# Patient Record
Sex: FEMALE | Race: WHITE
[De-identification: names, ages, dates, MRNs, and addresses within clinical notes are randomized per-mention and may not be internally consistent; named-entity substitution may affect disease eponyms.]

---

## 2018-05-31 ENCOUNTER — HOSPITAL ENCOUNTER (OUTPATIENT)
Dept: HOSPITAL 83 - ORTHO | Age: 48
Discharge: HOME | End: 2018-05-31
Attending: ORTHOPAEDIC SURGERY
Payer: COMMERCIAL

## 2018-05-31 DIAGNOSIS — M17.12: Primary | ICD-10-CM

## 2018-05-31 DIAGNOSIS — Z98.890: ICD-10-CM

## 2018-07-06 ENCOUNTER — HOSPITAL ENCOUNTER (OUTPATIENT)
Dept: HOSPITAL 83 - LAB | Age: 48
Discharge: HOME | End: 2018-07-06
Attending: ORTHOPAEDIC SURGERY
Payer: COMMERCIAL

## 2018-07-06 DIAGNOSIS — I10: ICD-10-CM

## 2018-07-06 DIAGNOSIS — Z48.89: Primary | ICD-10-CM

## 2018-07-06 LAB
ALBUMIN SERPL-MCNC: 3.6 GM/DL (ref 3.1–4.5)
ALP SERPL-CCNC: 92 U/L (ref 45–117)
ALT SERPL W P-5'-P-CCNC: 14 U/L (ref 12–78)
APPEARANCE UR: CLEAR
AST SERPL-CCNC: 11 IU/L (ref 3–35)
BACTERIA #/AREA URNS HPF: (no result) /[HPF]
BASOPHILS # BLD AUTO: 0.1 10*3/UL (ref 0–0.1)
BASOPHILS NFR BLD AUTO: 0.5 % (ref 0–1)
BILIRUB UR QL STRIP: NEGATIVE
BUN SERPL-MCNC: 14 MG/DL (ref 7–24)
CHLORIDE SERPL-SCNC: 106 MMOL/L (ref 98–107)
COLOR UR: YELLOW
CREAT SERPL-MCNC: 0.71 MG/DL (ref 0.55–1.02)
CRYSTALS URNS MICRO: (no result)
EOSINOPHIL # BLD AUTO: 0.3 10*3/UL (ref 0–0.4)
EOSINOPHIL # BLD AUTO: 2.7 % (ref 1–4)
EPI CELLS #/AREA URNS HPF: (no result) /[HPF]
ERYTHROCYTE [DISTWIDTH] IN BLOOD BY AUTOMATED COUNT: 13.2 % (ref 0–14.5)
GLUCOSE UR QL: NEGATIVE
HCT VFR BLD AUTO: 42.9 % (ref 37–47)
HGB BLD-MCNC: 13.8 G/DL (ref 12–16)
HGB UR QL STRIP: NEGATIVE
KETONES UR QL STRIP: NEGATIVE
LEUKOCYTE ESTERASE UR QL STRIP: NEGATIVE
LYMPHOCYTES # BLD AUTO: 2.9 10*3/UL (ref 1.3–4.4)
LYMPHOCYTES NFR BLD AUTO: 26.6 % (ref 27–41)
MCH RBC QN AUTO: 29.7 PG (ref 27–31)
MCHC RBC AUTO-ENTMCNC: 32.2 G/DL (ref 33–37)
MCV RBC AUTO: 92.5 FL (ref 81–99)
MONOCYTES # BLD AUTO: 0.8 10*3/UL (ref 0.1–1)
MONOCYTES NFR BLD MANUAL: 6.8 % (ref 3–9)
NEUT #: 6.9 10*3/UL (ref 2.3–7.9)
NEUT %: 63.1 % (ref 47–73)
NITRITE UR QL STRIP: NEGATIVE
NRBC BLD QL AUTO: 0 % (ref 0–0)
PH UR STRIP: 6 [PH] (ref 5–9)
PLATELET # BLD AUTO: 300 10*3/UL (ref 130–400)
PMV BLD AUTO: 9.6 FL (ref 9.6–12.3)
POTASSIUM SERPL-SCNC: 4.2 MMOL/L (ref 3.5–5.1)
PROT SERPL-MCNC: 8.1 GM/DL (ref 6.4–8.2)
RBC # BLD AUTO: 4.64 10*6/UL (ref 4.1–5.1)
RBC #/AREA URNS HPF: (no result) RBC/HPF (ref 0–2)
SODIUM SERPL-SCNC: 143 MMOL/L (ref 136–145)
SP GR UR: 1.02 (ref 1–1.03)
UROBILINOGEN UR STRIP-MCNC: 1 E.U./DL (ref 0.2–1)
WBC #/AREA URNS HPF: (no result) WBC/HPF (ref 0–5)
WBC NRBC COR # BLD AUTO: 11 10*3/UL (ref 4.8–10.8)

## 2018-07-12 VITALS — DIASTOLIC BLOOD PRESSURE: 95 MMHG

## 2018-08-02 ENCOUNTER — HOSPITAL ENCOUNTER (OUTPATIENT)
Dept: HOSPITAL 83 - SDC | Age: 48
Discharge: HOME | End: 2018-08-02
Attending: ORTHOPAEDIC SURGERY
Payer: COMMERCIAL

## 2018-08-02 VITALS — SYSTOLIC BLOOD PRESSURE: 141 MMHG | DIASTOLIC BLOOD PRESSURE: 73 MMHG

## 2018-08-02 VITALS — DIASTOLIC BLOOD PRESSURE: 84 MMHG | SYSTOLIC BLOOD PRESSURE: 138 MMHG

## 2018-08-02 VITALS — WEIGHT: 240 LBS | BODY MASS INDEX: 39.99 KG/M2 | HEIGHT: 65 IN

## 2018-08-02 VITALS — DIASTOLIC BLOOD PRESSURE: 71 MMHG | SYSTOLIC BLOOD PRESSURE: 137 MMHG

## 2018-08-02 VITALS — SYSTOLIC BLOOD PRESSURE: 155 MMHG | DIASTOLIC BLOOD PRESSURE: 88 MMHG

## 2018-08-02 VITALS — DIASTOLIC BLOOD PRESSURE: 83 MMHG

## 2018-08-02 VITALS — DIASTOLIC BLOOD PRESSURE: 77 MMHG

## 2018-08-02 DIAGNOSIS — E03.9: ICD-10-CM

## 2018-08-02 DIAGNOSIS — T84.84XA: Primary | ICD-10-CM

## 2018-08-02 DIAGNOSIS — Z98.890: ICD-10-CM

## 2018-08-02 DIAGNOSIS — Z79.899: ICD-10-CM
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
## 2018-08-02 DIAGNOSIS — E66.09: ICD-10-CM

## 2018-08-02 DIAGNOSIS — F32.9: ICD-10-CM

## 2018-08-02 DIAGNOSIS — I10: ICD-10-CM

## 2018-08-29 ENCOUNTER — HOSPITAL ENCOUNTER (OUTPATIENT)
Dept: HOSPITAL 83 - LAB | Age: 48
Discharge: HOME | End: 2018-08-29
Attending: FAMILY MEDICINE
Payer: COMMERCIAL

## 2018-08-29 DIAGNOSIS — Z13.220: ICD-10-CM

## 2018-08-29 DIAGNOSIS — M79.89: ICD-10-CM

## 2018-08-29 DIAGNOSIS — Z13.1: ICD-10-CM

## 2018-08-29 DIAGNOSIS — E55.9: ICD-10-CM

## 2018-08-29 DIAGNOSIS — E03.9: ICD-10-CM

## 2018-08-29 DIAGNOSIS — Z12.31: Primary | ICD-10-CM

## 2018-08-29 LAB
ALBUMIN SERPL-MCNC: 3.6 GM/DL (ref 3.1–4.5)
ALP SERPL-CCNC: 102 U/L (ref 45–117)
ALT SERPL W P-5'-P-CCNC: 20 U/L (ref 12–78)
AST SERPL-CCNC: 19 IU/L (ref 3–35)
BASOPHILS # BLD AUTO: 0.1 10*3/UL (ref 0–0.1)
BASOPHILS NFR BLD AUTO: 0.6 % (ref 0–1)
BUN SERPL-MCNC: 17 MG/DL (ref 7–24)
CHLORIDE SERPL-SCNC: 107 MMOL/L (ref 98–107)
CHOLEST SERPL-MCNC: 185 MG/DL (ref ?–200)
CREAT SERPL-MCNC: 0.7 MG/DL (ref 0.55–1.02)
EOSINOPHIL # BLD AUTO: 0.3 10*3/UL (ref 0–0.4)
EOSINOPHIL # BLD AUTO: 2.5 % (ref 1–4)
ERYTHROCYTE [DISTWIDTH] IN BLOOD BY AUTOMATED COUNT: 14 % (ref 0–14.5)
HCT VFR BLD AUTO: 43.3 % (ref 37–47)
HDLC SERPL-MCNC: 45 MG/DL (ref 40–60)
HGB BLD-MCNC: 13.9 G/DL (ref 12–16)
LDLC SERPL DIRECT ASSAY-MCNC: 117 MG/DL (ref 9–159)
LYMPHOCYTES # BLD AUTO: 2.8 10*3/UL (ref 1.3–4.4)
LYMPHOCYTES NFR BLD AUTO: 27.9 % (ref 27–41)
MCH RBC QN AUTO: 29.6 PG (ref 27–31)
MCHC RBC AUTO-ENTMCNC: 32.1 G/DL (ref 33–37)
MCV RBC AUTO: 92.1 FL (ref 81–99)
MONOCYTES # BLD AUTO: 0.7 10*3/UL (ref 0.1–1)
MONOCYTES NFR BLD MANUAL: 7 % (ref 3–9)
NEUT #: 6.3 10*3/UL (ref 2.3–7.9)
NEUT %: 61.7 % (ref 47–73)
NRBC BLD QL AUTO: 0 10*3/UL (ref 0–0)
PLATELET # BLD AUTO: 254 10*3/UL (ref 130–400)
PMV BLD AUTO: 10.4 FL (ref 9.6–12.3)
POTASSIUM SERPL-SCNC: 4.1 MMOL/L (ref 3.5–5.1)
PROT SERPL-MCNC: 7.9 GM/DL (ref 6.4–8.2)
RBC # BLD AUTO: 4.7 10*6/UL (ref 4.1–5.1)
SODIUM SERPL-SCNC: 139 MMOL/L (ref 136–145)
TRIGL SERPL-MCNC: 117 MG/DL (ref ?–150)
VLDLC SERPL CALC-MCNC: 23 MG/DL (ref 6–40)
WBC NRBC COR # BLD AUTO: 10.2 10*3/UL (ref 4.8–10.8)

## 2020-02-20 ENCOUNTER — HOSPITAL ENCOUNTER (EMERGENCY)
Dept: HOSPITAL 83 - ED | Age: 50
Discharge: HOME | End: 2020-02-20
Payer: COMMERCIAL

## 2020-02-20 VITALS — HEIGHT: 65 IN | BODY MASS INDEX: 33.32 KG/M2 | WEIGHT: 200 LBS

## 2020-02-20 DIAGNOSIS — I10: ICD-10-CM

## 2020-02-20 DIAGNOSIS — E07.9: ICD-10-CM

## 2020-02-20 DIAGNOSIS — M17.11: Primary | ICD-10-CM

## 2020-02-20 DIAGNOSIS — Z79.899: ICD-10-CM

## 2020-06-01 ENCOUNTER — HOSPITAL ENCOUNTER (OUTPATIENT)
Dept: HOSPITAL 83 - LAB | Age: 50
Discharge: HOME | End: 2020-06-01
Attending: ORTHOPAEDIC SURGERY
Payer: COMMERCIAL

## 2020-06-01 DIAGNOSIS — I10: Primary | ICD-10-CM

## 2020-06-01 DIAGNOSIS — M25.50: ICD-10-CM

## 2020-06-01 LAB
BASOPHILS # BLD AUTO: 0.1 10*3/UL (ref 0–0.1)
BASOPHILS NFR BLD AUTO: 0.6 % (ref 0–1)
EOSINOPHIL # BLD AUTO: 0.4 10*3/UL (ref 0–0.4)
EOSINOPHIL # BLD AUTO: 3.5 % (ref 1–4)
ERYTHROCYTE [DISTWIDTH] IN BLOOD BY AUTOMATED COUNT: 14.3 % (ref 0–14.5)
HCT VFR BLD AUTO: 45.5 % (ref 37–47)
LYMPHOCYTES # BLD AUTO: 2.6 10*3/UL (ref 1.3–4.4)
LYMPHOCYTES NFR BLD AUTO: 24.8 % (ref 27–41)
MCH RBC QN AUTO: 30.5 PG (ref 27–31)
MCHC RBC AUTO-ENTMCNC: 33 G/DL (ref 33–37)
MCV RBC AUTO: 92.7 FL (ref 81–99)
MONOCYTES # BLD AUTO: 0.7 10*3/UL (ref 0.1–1)
MONOCYTES NFR BLD MANUAL: 6.4 % (ref 3–9)
NEUT #: 6.7 10*3/UL (ref 2.3–7.9)
NEUT %: 64.3 % (ref 47–73)
NRBC BLD QL AUTO: 0 % (ref 0–0)
PLATELET # BLD AUTO: 279 10*3/UL (ref 130–400)
PMV BLD AUTO: 10 FL (ref 9.6–12.3)
RBC # BLD AUTO: 4.91 10*6/UL (ref 4.1–5.1)
WBC NRBC COR # BLD AUTO: 10.3 10*3/UL (ref 4.8–10.8)

## 2020-06-02 LAB
ENA RNP AB SER-ACNC: <0.2 AI (ref 0–0.9)
HEPATITIS C VIRUS ANTIBODY: <0.1 S/CO (ref 0–0.9)
RHEUMATOID FACT SERPL-ACNC: 63.1 IU/ML (ref 0–13.9)

## 2020-06-03 LAB — CCP IGA+IGG SERPL IA-ACNC: >250 UNITS (ref 0–19)

## 2020-06-04 LAB
DVVTMIXRFX: (no result)
LA NT PLATELET PPP: 39.8 SEC (ref 0–51.9)
SCREEN DRVVT: 62 SEC (ref 0–47)

## 2020-09-30 ENCOUNTER — HOSPITAL ENCOUNTER (OUTPATIENT)
Dept: CT IMAGING | Age: 50
Discharge: HOME | End: 2020-10-02
Payer: COMMERCIAL

## 2020-09-30 PROCEDURE — 73700 CT LOWER EXTREMITY W/O DYE: CPT

## 2020-10-06 ENCOUNTER — HOSPITAL ENCOUNTER (OUTPATIENT)
Dept: PREADMISSION TESTING | Age: 50
Discharge: HOME | End: 2020-10-06
Payer: COMMERCIAL

## 2020-10-06 ENCOUNTER — ANESTHESIA EVENT (OUTPATIENT)
Dept: OPERATING ROOM | Age: 50
End: 2020-10-06
Payer: COMMERCIAL

## 2020-10-06 VITALS
DIASTOLIC BLOOD PRESSURE: 81 MMHG | RESPIRATION RATE: 16 BRPM | SYSTOLIC BLOOD PRESSURE: 141 MMHG | TEMPERATURE: 97.9 F | WEIGHT: 266 LBS | HEART RATE: 84 BPM | BODY MASS INDEX: 44.32 KG/M2 | OXYGEN SATURATION: 96 % | HEIGHT: 65 IN

## 2020-10-06 LAB — POTASSIUM SERPL-SCNC: 4.2 MMOL/L (ref 3.5–5)

## 2020-10-06 PROCEDURE — 84132 ASSAY OF SERUM POTASSIUM: CPT

## 2020-10-06 PROCEDURE — 36415 COLL VENOUS BLD VENIPUNCTURE: CPT

## 2020-10-06 PROCEDURE — 87081 CULTURE SCREEN ONLY: CPT

## 2020-10-06 RX ORDER — OXYBUTYNIN CHLORIDE 5 MG/1
5 TABLET, EXTENDED RELEASE ORAL 2 TIMES DAILY
COMMUNITY

## 2020-10-06 RX ORDER — MELOXICAM 15 MG/1
15 TABLET ORAL DAILY
COMMUNITY

## 2020-10-06 RX ORDER — HYDROCHLOROTHIAZIDE 25 MG/1
25 TABLET ORAL DAILY
COMMUNITY

## 2020-10-06 RX ORDER — MIDAZOLAM HYDROCHLORIDE 1 MG/ML
1 INJECTION INTRAMUSCULAR; INTRAVENOUS PRN
Status: CANCELLED | OUTPATIENT
Start: 2020-10-14

## 2020-10-06 RX ORDER — ROPIVACAINE HYDROCHLORIDE 5 MG/ML
30 INJECTION, SOLUTION EPIDURAL; INFILTRATION; PERINEURAL ONCE
Status: CANCELLED | OUTPATIENT
Start: 2020-10-14

## 2020-10-06 RX ORDER — HYDROXYCHLOROQUINE SULFATE 200 MG/1
TABLET, FILM COATED ORAL 2 TIMES DAILY
COMMUNITY

## 2020-10-06 RX ORDER — LEVOTHYROXINE SODIUM 0.03 MG/1
25 TABLET ORAL DAILY
COMMUNITY

## 2020-10-06 SDOH — HEALTH STABILITY: MENTAL HEALTH: HOW OFTEN DO YOU HAVE A DRINK CONTAINING ALCOHOL?: NEVER

## 2020-10-06 ASSESSMENT — KOOS JR
HOW SEVERE IS YOUR KNEE STIFFNESS AFTER FIRST WAKING IN MORNING: 3
RISING FROM SITTING: 3
STRAIGHTENING KNEE FULLY: 1
BENDING TO THE FLOOR TO PICK UP OBJECT: 3
TWISING OR PIVOTING ON KNEE: 3
STANDING UPRIGHT: 3
GOING UP OR DOWN STAIRS: 3

## 2020-10-06 ASSESSMENT — LIFESTYLE VARIABLES: SMOKING_STATUS: 0

## 2020-10-06 NOTE — PROGRESS NOTES
[facility] PRE-ADMISSION TESTING INSTRUCTIONS    The Preadmission Testing patient is instructed accordingly using the following criteria (check applicable):    ARRIVAL INSTRUCTIONS:  [x] Parking the day of Surgery is located in the Main Entrance lot. Upon entering the door, make an immediate right to the surgery reception desk    [] [phone] is available Monday through Friday 6 am to 6 pm    [x] Bring photo ID and insurance card    [] Bring in a copy of Living will or Durable Power of  papers. [] Please be sure to arrange for responsible adult to provide transportation to and from the hospital    [] Please arrange for responsible adult to be with you for the 24 hour period post procedure due to having anesthesia      GENERAL INSTRUCTIONS:    [x] Nothing by mouth after midnight, including gum, candy, mints or water    [x] You may brush your teeth, but do not swallow any water    [] Take medications as instructed with 1-2 oz of water    [] Stop herbal supplements and vitamins 5 days prior to procedure    [] Follow preop dosing of blood thinners per physician instructions    [] Take 1/2 dose of evening insulin, but no insulin after midnight    [] No oral diabetic medications after midnight    [] If diabetic and have low blood sugar or feel symptomatic, take 1-2oz apple juice only    [] Bring inhalers day of surgery    [] Bring C-PAP/ Bi-Pap day of surgery    [] Bring urine specimen day of surgery    [x]  no lotion, powders or creams    [] Follow bowel prep as instructed per surgeon    [] No tobacco products within 24 hours of surgery     [] No alcohol or illegal drug use within 24 hours of surgery.     [x] Jewelry, body piercing's, eyeglasses, contact lenses and dentures are not permitted into surgery (bring cases)      [x] Please do not wear any nail polish, make up or hair products on the day of surgery    [] If not already done, you can expect a call from registration    [x] You can expect a call the business day prior to procedure to notify you if your arrival time changes    [x] If you receive a survey after surgery we would greatly appreciate your comments    [] Parent/guardian of a minor must accompany their child and remain on the premises  the entire time they are under our care     [] Pediatric patients may bring favorite toy, blanket or comfort item with them    [] A caregiver or family member must remain with the patient during their stay if they are mentally handicapped, have dementia, disoriented or unable to use a call light or would be a safety concern if left unattended    [x] Please notify surgeon if you develop any illness between now and time of surgery (cold, cough, sore throat, fever, nausea, vomiting) or any signs of infections  including skin, wounds, and dental.    []  The Outpatient Pharmacy is available to fill your prescription here on your day of surgery, ask your preop nurse for details    [] Other instructions  EDUCATIONAL MATERIALS PROVIDED:    [x] PAT Preoperative Education Packet/Booklet     [x] Medication List    [] Fluoroscopy Information Pamphlet    [] Transfusion bracelet applied with instructions    [] Joint replacement video reviewed    [] Shower with soap, lather and rinse well, and use CHG wipes provided the evening before surgery as instructed

## 2020-10-06 NOTE — CARE COORDINATION
I met with Isaac Tilley this am for an orthopaedic education class. We discussed information regarding pre, intra, post-op, discharge, and LOS expectations. I presented the Total Knee video, and provided ortho education materials. I encouraged the patient to call with any questions or concerns.

## 2020-10-06 NOTE — ANESTHESIA PRE PROCEDURE
Department of Anesthesiology  Preprocedure Note       Name:  Lis Hogan   Age:  44 y.o.  :  1970                                          MRN:  [id number]         Date:  10/6/2020      Surgeon: Dallin Lazo):  Tone Bergman MD    Procedure: Procedure(s):  TRELL ROBOTIC ASSISTED  RIGHT KNEE TOTAL ARTHROPLASTY   ++EVI++   ++PNB++    Medications prior to admission:   Prior to Admission medications    Not on File       Current medications:    No current outpatient medications on file. No current facility-administered medications for this encounter. Allergies:  No Known Allergies    Problem List:  There is no problem list on file for this patient. Past Medical History:  No past medical history on file. Past Surgical History:  No past surgical history on file. Social History:    Social History     Tobacco Use    Smoking status: Not on file   Substance Use Topics    Alcohol use: Not on file                                Counseling given: Not Answered      Vital Signs (Current): There were no vitals filed for this visit. BP Readings from Last 3 Encounters:   No data found for BP       NPO Status:                                                                                 BMI:   Wt Readings from Last 3 Encounters:   No data found for Wt     There is no height or weight on file to calculate BMI.    CBC: No results found for: WBC, RBC, HGB, HCT, MCV, RDW, PLT    CMP: No results found for: NA, K, CL, CO2, BUN, CREATININE, GFRAA, AGRATIO, LABGLOM, GLUCOSE, PROT, CALCIUM, BILITOT, ALKPHOS, AST, ALT    POC Tests: No results for input(s): POCGLU, POCNA, POCK, POCCL, POCBUN, POCHEMO, POCHCT in the last 72 hours.     Coags: No results found for: PROTIME, INR, APTT    HCG (If Applicable): No results found for: PREGTESTUR, PREGSERUM, HCG, HCGQUANT     ABGs: No results found for: PHART, PO2ART, VPS0VFR, OGT6DCW, BEART, I9NUZINK     Type & Screen (If Applicable):  No results found for: LABABO, LABRH    Drug/Infectious Status (If Applicable):  No results found for: HIV, HEPCAB    COVID-19 Screening (If Applicable): No results found for: COVID19      Anesthesia Evaluation  Patient summary reviewed no history of anesthetic complications:   Airway: Mallampati: III  TM distance: >3 FB   Neck ROM: full  Mouth opening: > = 3 FB Dental:          Pulmonary:Negative Pulmonary ROS breath sounds clear to auscultation      (-) not a current smoker                           Cardiovascular:    (+) hypertension:,     (-)  angina    ECG reviewed  Rhythm: regular  Rate: normal           Beta Blocker:  Not on Beta Blocker      ROS comment: Medically cleared. Neuro/Psych:   Negative Neuro/Psych ROS              GI/Hepatic/Renal: Neg GI/Hepatic/Renal ROS            Endo/Other:    (+) hypothyroidism: arthritis:., .                  ROS comment: ?Hx of LUPUS--no problems--no therapy Abdominal:   (+) obese,         Vascular: negative vascular ROS. Anesthesia Plan      general, spinal and other     ASA 3     (Pt agrees to adductor canal block. She agrees to spinal anesthesia and IV sedation. Pt does not want spinal will take the ACB but wants general anesthetic. She agrees to [facility] as a back up plan if necessary. Risks and benefits were discussed.)  Induction: intravenous. Anesthetic plan and risks discussed with patient and mother. Plan discussed with CRNA. Tish Rinne, MD   10/6/2020        Pt seen questions answered plan outlined does not want spinal anesthetic has changed her mind and wants general anesthetic.  Skyla Puri m.D

## 2020-10-07 LAB — MRSA CULTURE ONLY: NORMAL

## 2020-10-09 ENCOUNTER — HOSPITAL ENCOUNTER (OUTPATIENT)
Age: 50
Discharge: HOME | End: 2020-10-11
Payer: COMMERCIAL

## 2020-10-09 PROCEDURE — U0003 INFECTIOUS AGENT DETECTION BY NUCLEIC ACID (DNA OR RNA); SEVERE ACUTE RESPIRATORY SYNDROME CORONAVIRUS 2 (SARS-COV-2) (CORONAVIRUS DISEASE [COVID-19]), AMPLIFIED PROBE TECHNIQUE, MAKING USE OF HIGH THROUGHPUT TECHNOLOGIES AS DESCRIBED BY CMS-2020-01-R: HCPCS

## 2020-10-11 LAB
SARS-COV-2: NOT DETECTED
SOURCE: NORMAL

## 2020-10-13 PROCEDURE — 99243 OFF/OP CNSLTJ NEW/EST LOW 30: CPT | Performed by: HOSPITALIST

## 2020-10-13 NOTE — H&P
History and Physical  VICKEY Miramontes MD        Chief Complaint       Niyah Ramirez, a 44year old female, is seen today for a right knee pain for a duration of  2 months . Patient rates her pain as 9/10 describes pain as being continuous, worsening, radiating,  and is worse all day. Pain causes her to wake up at night. Niyah Ramirez was seen by Dr. Marcel Culver and states he wanted her to go to the [facility]. Patient had prior imaging which were  x-rays. She has tried physical therapy and injections and states they did not help. Pain: anterior knee, medial knee  Symptoms: swelling, instability, night pain  Previous Treatment: cortisone injection, NSAIDS, physical therapy, home exercise program  Additional Comments: Niyah Ramirez is a 44Years Old Female who presents to the office for evaluation of right knee pain. She has pain for the past 2 years that has gotten worse over the past 6 months. She has seen Dr. Marcel Culver and had cortisone injectionand PT. Currently pain is 9/10. Pain is worse with activity, walking, stairs and pain improves with rest. She has tried meloxicam, motrin and tylenol for pain. Past medical history is significant for hypertension and hypothyroid. She is currenlty not working.       Physical Exam   General Appearance:   Awake, alert, oriented x3  Well developed, well nourished  Obese  No acute distress  Eyes appear Normal    Respiratory:       Respiratory effort: non-labored    Cardiovascular:   Edema: absent      Varicosities: absent    Lymphatic/Skin:       Skin Appearance: Normal              Right Knee Exam   Skin Exam:   skin intact  Alignment:   varus  ROM:   20-90  Tenderness:   anterior knee, medial joint line  Junito:   with pain  Laxity with varus/valgus stress:   <5 degrees    Left Knee Exam   Skin Exam:   skin intact, prior incision  Effusion:   none  Alignment:   varus  ROM:   0-90  Tenderness:   anterior knee, medial joint line  Gait:   antalgic  Comments:   Sensation intact to light touch L1-S1  TA/EHL/GS intact  Palpable DP, W/WP  Calf soft, non tender     Right Hip Exam   Painful ROM:   no  Normal range of motion    Other Testing   X-Ray Findings August 28, 2020 Xray 4 views right knee were obtained at [facility] and reviewed in the office today. They show severe degenerative changes with severe joint space narrowing, bone on bone in the medial and patellofemoral joint, peripheral osteophytes, subchondral sclerosis and varus alignment. Past Medical History - reviewed  Rheumatoid arthritis  Hypertension  Thyroid disease  Lupus    Family History - reviewed  Arthritis (mother)    Surgical History - reviewed  left knee steel plate and screws   lung surgery     Social History - reviewed    Risk Factors - reviewed  Patient had a flu shot in the last 12 months? no  The patient is 72 years or older and has had a pneumonia vaccine: n/a  Patient has an implant none  Tobacco status: former smoker  Alcohol use: does not drink alcohol  Does patient exercise? no  In the past 12 months, have you fallen? no        Current Medications (including meds started today):   MELOXICAM 15 MG ORAL TABLET (MELOXICAM) ; Route: ORAL  HYDROXYCHLOROQUINE SULFATE 200 MG ORAL TABLET (HYDROXYCHLOROQUINE SULFATE) ; Route: ORAL  SYNTHROID 25 MCG ORAL TABLET (LEVOTHYROXINE SODIUM) ; Route: ORAL  HYDROCHLOROTHIAZIDE 25 MG ORAL TABLET (HYDROCHLOROTHIAZIDE) ; Route: ORAL      Current Allergies (reviewed this update):  No known allergies      Vital Signs   Weight: 240.00 lbs. (108.86 kg.)  Height: 65 in.    (165.10 cm.)  Temperature: 98.20 deg F    (36.78 deg C)  Body Mass Index: 39.94  Body Surface Area: 2.14 m2  Due to COVID-19 requirements I did not take a blood pressure today. Review of Systems   General:  Patient denies sweats, weight loss, fevers, chills, fatigue. Eyes:  Patient denies eye irritation, vision loss - 1 eye, discharge, blurring, vision loss - both eyes.    ENT:  Patient denies decreased hearing, difficulty swallowing. Cardiovascular: Positive for swelling of hands or feet. Respiratory:  Patient denies cough, coughing up blood, chest discomfort, wheezing, shortness of breath. Gastrointestinal:  Patient denies vomiting, loss of appetite, diarrhea, nausea. Genitourinary:  Patient denies urinary retention, urinary frequency, frequent UTI, urinary urgency, pain. Musculoskeletal: Positive for arthritis. Skin:  Patient denies dryness, unusual hair distribution, suspicious lesions, psoriasis, changes in color of skin, changes in nail beds, poor wound healing. Neurologic:  Patient denies headaches, tingling, visual disturbances, weakness, seizures, memory loss, fainting, tremors, poor balance, disturbances in coordination, numbness, falling down. Psychiatric:  Patient denies anxiety, depression. Heme/Lymphatic:  Patient denies abnormal bruising. Allergic/Immunologic:  Patient denies seasonal allergies, persistent infections. The remainder of the complete review of systems was negative. Impression   1. Right knee osteoarthritis: Deteriorated  2. Varus deformity, not elsewhere classified, right knee: Deteriorated  3. Knee joint pain, right: Deteriorated    Plan of Care:   I had a long discussion regarding degenerative joint disease. Operative and nonoperative treatment was discussed. Xrays show severe degenerative changes, nonop treatment was failed and pain has become a quality of life issue. Futher nonoperative treatment is unlikely to provide any benefit. Susi Peter would like to proceed with Right Allan Total Knee Arthroplasty. The surgery, implants, postoperative course and risks and benefits of surgery were discussed and all questions were answered.

## 2020-10-14 ENCOUNTER — APPOINTMENT (OUTPATIENT)
Dept: GENERAL RADIOLOGY | Age: 50
End: 2020-10-14
Attending: ORTHOPAEDIC SURGERY
Payer: COMMERCIAL

## 2020-10-14 ENCOUNTER — HOSPITAL ENCOUNTER (OUTPATIENT)
Age: 50
Setting detail: OBSERVATION
Discharge: HOME | End: 2020-10-15
Attending: ORTHOPAEDIC SURGERY | Admitting: ORTHOPAEDIC SURGERY
Payer: COMMERCIAL

## 2020-10-14 ENCOUNTER — ANESTHESIA (OUTPATIENT)
Dept: OPERATING ROOM | Age: 50
End: 2020-10-14
Payer: COMMERCIAL

## 2020-10-14 VITALS — SYSTOLIC BLOOD PRESSURE: 99 MMHG | OXYGEN SATURATION: 99 % | TEMPERATURE: 96.3 F | DIASTOLIC BLOOD PRESSURE: 54 MMHG

## 2020-10-14 PROBLEM — M17.11 PRIMARY OSTEOARTHRITIS OF RIGHT KNEE: Status: ACTIVE | Noted: 2020-10-14

## 2020-10-14 PROCEDURE — 2580000003 HC RX 258: Performed by: ORTHOPAEDIC SURGERY

## 2020-10-14 PROCEDURE — 3600000015 HC SURGERY LEVEL 5 ADDTL 15MIN: Performed by: ORTHOPAEDIC SURGERY

## 2020-10-14 PROCEDURE — 3600000005 HC SURGERY LEVEL 5 BASE: Performed by: ORTHOPAEDIC SURGERY

## 2020-10-14 PROCEDURE — 6360000002 HC RX W HCPCS: Performed by: ORTHOPAEDIC SURGERY

## 2020-10-14 PROCEDURE — 2720000010 HC SURG SUPPLY STERILE: Performed by: ORTHOPAEDIC SURGERY

## 2020-10-14 PROCEDURE — 88311 DECALCIFY TISSUE: CPT

## 2020-10-14 PROCEDURE — 73560 X-RAY EXAM OF KNEE 1 OR 2: CPT

## 2020-10-14 PROCEDURE — 64447 NJX AA&/STRD FEMORAL NRV IMG: CPT | Performed by: ANESTHESIOLOGY

## 2020-10-14 PROCEDURE — 2500000003 HC RX 250 WO HCPCS: Performed by: ORTHOPAEDIC SURGERY

## 2020-10-14 PROCEDURE — 2709999900 HC NON-CHARGEABLE SUPPLY: Performed by: ORTHOPAEDIC SURGERY

## 2020-10-14 PROCEDURE — 7100000001 HC PACU RECOVERY - ADDTL 15 MIN: Performed by: ORTHOPAEDIC SURGERY

## 2020-10-14 PROCEDURE — G0378 HOSPITAL OBSERVATION PER HR: HCPCS

## 2020-10-14 PROCEDURE — 6360000002 HC RX W HCPCS: Performed by: ANESTHESIOLOGY

## 2020-10-14 PROCEDURE — C1713 ANCHOR/SCREW BN/BN,TIS/BN: HCPCS | Performed by: ORTHOPAEDIC SURGERY

## 2020-10-14 PROCEDURE — 88305 TISSUE EXAM BY PATHOLOGIST: CPT

## 2020-10-14 PROCEDURE — C1776 JOINT DEVICE (IMPLANTABLE): HCPCS | Performed by: ORTHOPAEDIC SURGERY

## 2020-10-14 PROCEDURE — 3700000000 HC ANESTHESIA ATTENDED CARE: Performed by: ORTHOPAEDIC SURGERY

## 2020-10-14 PROCEDURE — 6370000000 HC RX 637 (ALT 250 FOR IP): Performed by: ORTHOPAEDIC SURGERY

## 2020-10-14 PROCEDURE — 6360000002 HC RX W HCPCS

## 2020-10-14 PROCEDURE — 2500000003 HC RX 250 WO HCPCS

## 2020-10-14 PROCEDURE — 3700000001 HC ADD 15 MINUTES (ANESTHESIA): Performed by: ORTHOPAEDIC SURGERY

## 2020-10-14 PROCEDURE — 7100000000 HC PACU RECOVERY - FIRST 15 MIN: Performed by: ORTHOPAEDIC SURGERY

## 2020-10-14 DEVICE — IMPLANTABLE DEVICE: Type: IMPLANTABLE DEVICE | Site: KNEE | Status: FUNCTIONAL

## 2020-10-14 DEVICE — BASEPLATE TIB SZ 3 AP44MM ML67MM KNEE TRITANIUM 4 CRUCFRM: Type: IMPLANTABLE DEVICE | Site: KNEE | Status: FUNCTIONAL

## 2020-10-14 DEVICE — INSERT TIB CR 3 10 MM KNEE X3 TRIATHLON: Type: IMPLANTABLE DEVICE | Site: KNEE | Status: FUNCTIONAL

## 2020-10-14 DEVICE — COMPONENT PAT DIA31MM THK9MM KNEE TRITANIUM MTL BK: Type: IMPLANTABLE DEVICE | Site: KNEE | Status: FUNCTIONAL

## 2020-10-14 RX ORDER — GLYCOPYRROLATE 1 MG/5 ML
SYRINGE (ML) INTRAVENOUS PRN
Status: DISCONTINUED | OUTPATIENT
Start: 2020-10-14 | End: 2020-10-14 | Stop reason: SDUPTHER

## 2020-10-14 RX ORDER — PROPOFOL 10 MG/ML
INJECTION, EMULSION INTRAVENOUS CONTINUOUS PRN
Status: DISCONTINUED | OUTPATIENT
Start: 2020-10-14 | End: 2020-10-14 | Stop reason: SDUPTHER

## 2020-10-14 RX ORDER — KETOROLAC TROMETHAMINE 30 MG/ML
30 INJECTION, SOLUTION INTRAMUSCULAR; INTRAVENOUS EVERY 6 HOURS
Status: DISCONTINUED | OUTPATIENT
Start: 2020-10-14 | End: 2020-10-15 | Stop reason: HOSPADM

## 2020-10-14 RX ORDER — ACETAMINOPHEN 500 MG
1000 TABLET ORAL ONCE
Status: COMPLETED | OUTPATIENT
Start: 2020-10-14 | End: 2020-10-14

## 2020-10-14 RX ORDER — SODIUM CHLORIDE 0.9 % (FLUSH) 0.9 %
10 SYRINGE (ML) INJECTION EVERY 12 HOURS SCHEDULED
Status: DISCONTINUED | OUTPATIENT
Start: 2020-10-14 | End: 2020-10-15 | Stop reason: HOSPADM

## 2020-10-14 RX ORDER — VANCOMYCIN HYDROCHLORIDE 1 G/20ML
INJECTION, POWDER, LYOPHILIZED, FOR SOLUTION INTRAVENOUS PRN
Status: DISCONTINUED | OUTPATIENT
Start: 2020-10-14 | End: 2020-10-14 | Stop reason: ALTCHOICE

## 2020-10-14 RX ORDER — SODIUM CHLORIDE 0.9 % (FLUSH) 0.9 %
10 SYRINGE (ML) INJECTION PRN
Status: DISCONTINUED | OUTPATIENT
Start: 2020-10-14 | End: 2020-10-15 | Stop reason: HOSPADM

## 2020-10-14 RX ORDER — MIDAZOLAM HYDROCHLORIDE 1 MG/ML
INJECTION INTRAMUSCULAR; INTRAVENOUS PRN
Status: DISCONTINUED | OUTPATIENT
Start: 2020-10-14 | End: 2020-10-14 | Stop reason: SDUPTHER

## 2020-10-14 RX ORDER — DEXAMETHASONE SODIUM PHOSPHATE 10 MG/ML
8 INJECTION, SOLUTION INTRAMUSCULAR; INTRAVENOUS ONCE
Status: COMPLETED | OUTPATIENT
Start: 2020-10-14 | End: 2020-10-14

## 2020-10-14 RX ORDER — HYDROXYCHLOROQUINE SULFATE 200 MG/1
200 TABLET, FILM COATED ORAL 2 TIMES DAILY
Status: DISCONTINUED | OUTPATIENT
Start: 2020-10-14 | End: 2020-10-15 | Stop reason: HOSPADM

## 2020-10-14 RX ORDER — ROPIVACAINE HYDROCHLORIDE 5 MG/ML
30 INJECTION, SOLUTION EPIDURAL; INFILTRATION; PERINEURAL ONCE
Status: COMPLETED | OUTPATIENT
Start: 2020-10-14 | End: 2020-10-14

## 2020-10-14 RX ORDER — BUPIVACAINE HYDROCHLORIDE 7.5 MG/ML
INJECTION, SOLUTION INTRASPINAL PRN
Status: DISCONTINUED | OUTPATIENT
Start: 2020-10-14 | End: 2020-10-14 | Stop reason: SDUPTHER

## 2020-10-14 RX ORDER — SENNA AND DOCUSATE SODIUM 50; 8.6 MG/1; MG/1
1 TABLET, FILM COATED ORAL 2 TIMES DAILY
Status: DISCONTINUED | OUTPATIENT
Start: 2020-10-14 | End: 2020-10-15 | Stop reason: HOSPADM

## 2020-10-14 RX ORDER — ONDANSETRON 2 MG/ML
4 INJECTION INTRAMUSCULAR; INTRAVENOUS EVERY 6 HOURS PRN
Status: DISCONTINUED | OUTPATIENT
Start: 2020-10-14 | End: 2020-10-15 | Stop reason: HOSPADM

## 2020-10-14 RX ORDER — TRAMADOL HYDROCHLORIDE 50 MG/1
50 TABLET ORAL EVERY 6 HOURS
Status: DISCONTINUED | OUTPATIENT
Start: 2020-10-14 | End: 2020-10-15 | Stop reason: HOSPADM

## 2020-10-14 RX ORDER — SODIUM CHLORIDE 0.9 % (FLUSH) 0.9 %
10 SYRINGE (ML) INJECTION EVERY 12 HOURS SCHEDULED
Status: DISCONTINUED | OUTPATIENT
Start: 2020-10-14 | End: 2020-10-14 | Stop reason: HOSPADM

## 2020-10-14 RX ORDER — HYDROCHLOROTHIAZIDE 25 MG/1
25 TABLET ORAL DAILY
Status: DISCONTINUED | OUTPATIENT
Start: 2020-10-14 | End: 2020-10-15 | Stop reason: HOSPADM

## 2020-10-14 RX ORDER — ONDANSETRON 2 MG/ML
4 INJECTION INTRAMUSCULAR; INTRAVENOUS
Status: COMPLETED | OUTPATIENT
Start: 2020-10-14 | End: 2020-10-14

## 2020-10-14 RX ORDER — GABAPENTIN 300 MG/1
600 CAPSULE ORAL ONCE
Status: COMPLETED | OUTPATIENT
Start: 2020-10-14 | End: 2020-10-14

## 2020-10-14 RX ORDER — OXYBUTYNIN CHLORIDE 5 MG/1
5 TABLET, EXTENDED RELEASE ORAL 2 TIMES DAILY
Status: DISCONTINUED | OUTPATIENT
Start: 2020-10-14 | End: 2020-10-15 | Stop reason: HOSPADM

## 2020-10-14 RX ORDER — SODIUM CHLORIDE 9 MG/ML
INJECTION, SOLUTION INTRAVENOUS CONTINUOUS
Status: ACTIVE | OUTPATIENT
Start: 2020-10-14 | End: 2020-10-15

## 2020-10-14 RX ORDER — MEPERIDINE HYDROCHLORIDE 25 MG/ML
25 INJECTION INTRAMUSCULAR; INTRAVENOUS; SUBCUTANEOUS EVERY 5 MIN PRN
Status: DISCONTINUED | OUTPATIENT
Start: 2020-10-14 | End: 2020-10-14 | Stop reason: HOSPADM

## 2020-10-14 RX ORDER — FENTANYL CITRATE 50 UG/ML
INJECTION, SOLUTION INTRAMUSCULAR; INTRAVENOUS PRN
Status: DISCONTINUED | OUTPATIENT
Start: 2020-10-14 | End: 2020-10-14 | Stop reason: SDUPTHER

## 2020-10-14 RX ORDER — ROPIVACAINE HYDROCHLORIDE 5 MG/ML
INJECTION, SOLUTION EPIDURAL; INFILTRATION; PERINEURAL
Status: COMPLETED | OUTPATIENT
Start: 2020-10-14 | End: 2020-10-14

## 2020-10-14 RX ORDER — CELECOXIB 100 MG/1
200 CAPSULE ORAL ONCE
Status: COMPLETED | OUTPATIENT
Start: 2020-10-14 | End: 2020-10-14

## 2020-10-14 RX ORDER — OXYCODONE HYDROCHLORIDE 5 MG/1
10 TABLET ORAL EVERY 4 HOURS PRN
Status: DISCONTINUED | OUTPATIENT
Start: 2020-10-14 | End: 2020-10-15 | Stop reason: HOSPADM

## 2020-10-14 RX ORDER — FENTANYL CITRATE 50 UG/ML
25 INJECTION, SOLUTION INTRAMUSCULAR; INTRAVENOUS EVERY 5 MIN PRN
Status: DISCONTINUED | OUTPATIENT
Start: 2020-10-14 | End: 2020-10-14 | Stop reason: HOSPADM

## 2020-10-14 RX ORDER — SODIUM CHLORIDE 9 MG/ML
INJECTION, SOLUTION INTRAVENOUS CONTINUOUS
Status: DISCONTINUED | OUTPATIENT
Start: 2020-10-14 | End: 2020-10-14

## 2020-10-14 RX ORDER — OXYCODONE HYDROCHLORIDE 5 MG/1
5 TABLET ORAL EVERY 4 HOURS PRN
Status: DISCONTINUED | OUTPATIENT
Start: 2020-10-14 | End: 2020-10-15 | Stop reason: HOSPADM

## 2020-10-14 RX ORDER — ACETAMINOPHEN 325 MG/1
650 TABLET ORAL EVERY 6 HOURS
Status: DISCONTINUED | OUTPATIENT
Start: 2020-10-14 | End: 2020-10-15 | Stop reason: HOSPADM

## 2020-10-14 RX ORDER — DEXAMETHASONE SODIUM PHOSPHATE 10 MG/ML
10 INJECTION INTRAMUSCULAR; INTRAVENOUS ONCE
Status: DISCONTINUED | OUTPATIENT
Start: 2020-10-15 | End: 2020-10-15

## 2020-10-14 RX ORDER — MIDAZOLAM HYDROCHLORIDE 1 MG/ML
1 INJECTION INTRAMUSCULAR; INTRAVENOUS PRN
Status: COMPLETED | OUTPATIENT
Start: 2020-10-14 | End: 2020-10-14

## 2020-10-14 RX ORDER — FENTANYL CITRATE 50 UG/ML
50 INJECTION, SOLUTION INTRAMUSCULAR; INTRAVENOUS EVERY 5 MIN PRN
Status: DISCONTINUED | OUTPATIENT
Start: 2020-10-14 | End: 2020-10-14 | Stop reason: HOSPADM

## 2020-10-14 RX ORDER — LEVOTHYROXINE SODIUM 0.03 MG/1
25 TABLET ORAL DAILY
Status: DISCONTINUED | OUTPATIENT
Start: 2020-10-14 | End: 2020-10-15 | Stop reason: HOSPADM

## 2020-10-14 RX ORDER — SODIUM CHLORIDE 0.9 % (FLUSH) 0.9 %
10 SYRINGE (ML) INJECTION PRN
Status: DISCONTINUED | OUTPATIENT
Start: 2020-10-14 | End: 2020-10-14 | Stop reason: HOSPADM

## 2020-10-14 RX ADMIN — MIDAZOLAM HYDROCHLORIDE 1 MG: 1 INJECTION, SOLUTION INTRAMUSCULAR; INTRAVENOUS at 09:47

## 2020-10-14 RX ADMIN — ACETAMINOPHEN 1000 MG: 500 TABLET ORAL at 09:03

## 2020-10-14 RX ADMIN — GABAPENTIN 600 MG: 300 CAPSULE ORAL at 09:03

## 2020-10-14 RX ADMIN — FENTANYL CITRATE 25 MCG: 50 INJECTION, SOLUTION INTRAMUSCULAR; INTRAVENOUS at 14:43

## 2020-10-14 RX ADMIN — PHENYLEPHRINE HYDROCHLORIDE 100 MCG: 10 INJECTION INTRAVENOUS at 14:36

## 2020-10-14 RX ADMIN — OXYBUTYNIN CHLORIDE 5 MG: 5 TABLET, EXTENDED RELEASE ORAL at 18:24

## 2020-10-14 RX ADMIN — MIDAZOLAM HYDROCHLORIDE 1 MG: 1 INJECTION, SOLUTION INTRAMUSCULAR; INTRAVENOUS at 10:00

## 2020-10-14 RX ADMIN — CELECOXIB 200 MG: 100 CAPSULE ORAL at 09:03

## 2020-10-14 RX ADMIN — CEFAZOLIN 3 G: 10 INJECTION, POWDER, FOR SOLUTION INTRAVENOUS at 21:05

## 2020-10-14 RX ADMIN — ROPIVACAINE HYDROCHLORIDE 30 ML: 5 INJECTION, SOLUTION EPIDURAL; INFILTRATION; PERINEURAL at 12:59

## 2020-10-14 RX ADMIN — PHENYLEPHRINE HYDROCHLORIDE 100 MCG: 10 INJECTION INTRAVENOUS at 13:37

## 2020-10-14 RX ADMIN — CEFAZOLIN 3 G: 10 INJECTION, POWDER, FOR SOLUTION INTRAVENOUS at 13:09

## 2020-10-14 RX ADMIN — PHENYLEPHRINE HYDROCHLORIDE 100 MCG: 10 INJECTION INTRAVENOUS at 14:23

## 2020-10-14 RX ADMIN — PROPOFOL 100 MCG/KG/MIN: 10 INJECTION, EMULSION INTRAVENOUS at 13:18

## 2020-10-14 RX ADMIN — PHENYLEPHRINE HYDROCHLORIDE 100 MCG: 10 INJECTION INTRAVENOUS at 14:52

## 2020-10-14 RX ADMIN — Medication 0.2 MG: at 13:28

## 2020-10-14 RX ADMIN — TRANEXAMIC ACID 1000 MG: 1 INJECTION, SOLUTION INTRAVENOUS at 16:21

## 2020-10-14 RX ADMIN — DOCUSATE SODIUM 50 MG AND SENNOSIDES 8.6 MG 1 TABLET: 8.6; 5 TABLET, FILM COATED ORAL at 21:05

## 2020-10-14 RX ADMIN — TRANEXAMIC ACID 1000 MG: 1 INJECTION, SOLUTION INTRAVENOUS at 13:21

## 2020-10-14 RX ADMIN — PHENYLEPHRINE HYDROCHLORIDE 100 MCG: 10 INJECTION INTRAVENOUS at 14:02

## 2020-10-14 RX ADMIN — FENTANYL CITRATE 50 MCG: 50 INJECTION, SOLUTION INTRAMUSCULAR; INTRAVENOUS at 13:13

## 2020-10-14 RX ADMIN — PHENYLEPHRINE HYDROCHLORIDE 100 MCG: 10 INJECTION INTRAVENOUS at 13:46

## 2020-10-14 RX ADMIN — PHENYLEPHRINE HYDROCHLORIDE 100 MCG: 10 INJECTION INTRAVENOUS at 14:11

## 2020-10-14 RX ADMIN — SODIUM CHLORIDE: 9 INJECTION, SOLUTION INTRAVENOUS at 16:32

## 2020-10-14 RX ADMIN — PHENYLEPHRINE HYDROCHLORIDE 100 MCG: 10 INJECTION INTRAVENOUS at 13:22

## 2020-10-14 RX ADMIN — PHENYLEPHRINE HYDROCHLORIDE 100 MCG: 10 INJECTION INTRAVENOUS at 13:26

## 2020-10-14 RX ADMIN — TRAMADOL HYDROCHLORIDE 50 MG: 50 TABLET, FILM COATED ORAL at 18:24

## 2020-10-14 RX ADMIN — SODIUM CHLORIDE: 9 INJECTION, SOLUTION INTRAVENOUS at 08:35

## 2020-10-14 RX ADMIN — PHENYLEPHRINE HYDROCHLORIDE 100 MCG: 10 INJECTION INTRAVENOUS at 13:57

## 2020-10-14 RX ADMIN — DEXAMETHASONE SODIUM PHOSPHATE 10 MG: 10 INJECTION INTRAMUSCULAR; INTRAVENOUS at 13:18

## 2020-10-14 RX ADMIN — KETOROLAC TROMETHAMINE 30 MG: 30 INJECTION, SOLUTION INTRAMUSCULAR; INTRAVENOUS at 21:05

## 2020-10-14 RX ADMIN — PHENYLEPHRINE HYDROCHLORIDE 100 MCG: 10 INJECTION INTRAVENOUS at 13:32

## 2020-10-14 RX ADMIN — SODIUM CHLORIDE: 9 INJECTION, SOLUTION INTRAVENOUS at 18:03

## 2020-10-14 RX ADMIN — ONDANSETRON 4 MG: 2 INJECTION INTRAMUSCULAR; INTRAVENOUS at 14:49

## 2020-10-14 RX ADMIN — SODIUM CHLORIDE: 9 INJECTION, SOLUTION INTRAVENOUS at 13:46

## 2020-10-14 RX ADMIN — HYDROXYCHLOROQUINE SULFATE 200 MG: 200 TABLET ORAL at 21:05

## 2020-10-14 RX ADMIN — ROPIVACAINE HYDROCHLORIDE 30 ML: 5 INJECTION, SOLUTION EPIDURAL; INFILTRATION; PERINEURAL at 10:01

## 2020-10-14 RX ADMIN — ACETAMINOPHEN 650 MG: 325 TABLET ORAL at 18:24

## 2020-10-14 RX ADMIN — ASPIRIN 325 MG: 325 TABLET, COATED ORAL at 18:24

## 2020-10-14 RX ADMIN — PHENYLEPHRINE HYDROCHLORIDE 100 MCG: 10 INJECTION INTRAVENOUS at 14:15

## 2020-10-14 RX ADMIN — PHENYLEPHRINE HYDROCHLORIDE 100 MCG: 10 INJECTION INTRAVENOUS at 14:06

## 2020-10-14 RX ADMIN — PHENYLEPHRINE HYDROCHLORIDE 50 MCG: 10 INJECTION INTRAVENOUS at 14:42

## 2020-10-14 RX ADMIN — PHENYLEPHRINE HYDROCHLORIDE 100 MCG: 10 INJECTION INTRAVENOUS at 13:41

## 2020-10-14 RX ADMIN — MIDAZOLAM 2 MG: 1 INJECTION INTRAMUSCULAR; INTRAVENOUS at 13:02

## 2020-10-14 RX ADMIN — BUPIVACAINE HYDROCHLORIDE IN DEXTROSE 1.8 ML: 7.5 INJECTION, SOLUTION SUBARACHNOID at 13:17

## 2020-10-14 RX ADMIN — PHENYLEPHRINE HYDROCHLORIDE 100 MCG: 10 INJECTION INTRAVENOUS at 14:30

## 2020-10-14 RX ADMIN — FENTANYL CITRATE 25 MCG: 50 INJECTION, SOLUTION INTRAMUSCULAR; INTRAVENOUS at 13:17

## 2020-10-14 RX ADMIN — FENTANYL CITRATE 50 MCG: 50 INJECTION, SOLUTION INTRAMUSCULAR; INTRAVENOUS at 10:00

## 2020-10-14 RX ADMIN — FENTANYL CITRATE 50 MCG: 50 INJECTION, SOLUTION INTRAMUSCULAR; INTRAVENOUS at 10:17

## 2020-10-14 ASSESSMENT — PULMONARY FUNCTION TESTS
PIF_VALUE: 1
PIF_VALUE: 0
PIF_VALUE: 1
PIF_VALUE: 1
PIF_VALUE: 0
PIF_VALUE: 0
PIF_VALUE: 1
PIF_VALUE: 0
PIF_VALUE: 1
PIF_VALUE: 0
PIF_VALUE: 1
PIF_VALUE: 0
PIF_VALUE: 0
PIF_VALUE: 1
PIF_VALUE: 0
PIF_VALUE: 1
PIF_VALUE: 0
PIF_VALUE: 1
PIF_VALUE: 0
PIF_VALUE: 1
PIF_VALUE: 0
PIF_VALUE: 1
PIF_VALUE: 0
PIF_VALUE: 1
PIF_VALUE: 1
PIF_VALUE: 0
PIF_VALUE: 0
PIF_VALUE: 1
PIF_VALUE: 0
PIF_VALUE: 1
PIF_VALUE: 0
PIF_VALUE: 1

## 2020-10-14 ASSESSMENT — PAIN DESCRIPTION - FREQUENCY
FREQUENCY: CONTINUOUS
FREQUENCY: CONTINUOUS

## 2020-10-14 ASSESSMENT — PAIN - FUNCTIONAL ASSESSMENT
PAIN_FUNCTIONAL_ASSESSMENT: PREVENTS OR INTERFERES SOME ACTIVE ACTIVITIES AND ADLS
PAIN_FUNCTIONAL_ASSESSMENT: ACTIVITIES ARE NOT PREVENTED
PAIN_FUNCTIONAL_ASSESSMENT: 0-10

## 2020-10-14 ASSESSMENT — PAIN DESCRIPTION - DESCRIPTORS
DESCRIPTORS: ACHING;DISCOMFORT;DULL
DESCRIPTORS: ACHING;DISCOMFORT

## 2020-10-14 ASSESSMENT — PAIN SCALES - GENERAL
PAINLEVEL_OUTOF10: 0
PAINLEVEL_OUTOF10: 2
PAINLEVEL_OUTOF10: 6
PAINLEVEL_OUTOF10: 0
PAINLEVEL_OUTOF10: 0
PAINLEVEL_OUTOF10: 2
PAINLEVEL_OUTOF10: 0
PAINLEVEL_OUTOF10: 0

## 2020-10-14 ASSESSMENT — PAIN DESCRIPTION - ORIENTATION
ORIENTATION: RIGHT
ORIENTATION: RIGHT

## 2020-10-14 ASSESSMENT — PAIN DESCRIPTION - PAIN TYPE
TYPE: SURGICAL PAIN
TYPE: SURGICAL PAIN

## 2020-10-14 ASSESSMENT — PAIN DESCRIPTION - PROGRESSION: CLINICAL_PROGRESSION: NOT CHANGED

## 2020-10-14 ASSESSMENT — PAIN DESCRIPTION - ONSET
ONSET: ON-GOING
ONSET: ON-GOING

## 2020-10-14 ASSESSMENT — PAIN DESCRIPTION - LOCATION
LOCATION: KNEE
LOCATION: KNEE

## 2020-10-14 NOTE — DISCHARGE SUMMARY
Patient ID:  Vick Mccray  [id number]  27 y.o.  1970    Admit date: 10/14/2020    Discharge date and time:  10/14/2020    Admitting Physician: VICKEY Reynolds MD     Discharge Physician: Madison Palmer. Scottie Reynolds MD    Admission Diagnoses: right Knee Osteoarthritis    Discharge Diagnoses: Same    Admission Condition: Good    Discharged Condition: Good    Procedure and Date: Allan right Total Knee Arthroplasty     Hospital Course: A Allan total knee arthroplasty was performed on 10/14/2020. Following this procedure the patient was admitted for a 1 day postoperative hospital stay. During this admission, DVT prophylaxis was followed using bilateral ICDs, SONNY hose, and daily ASA. Post-operative pain control was achieved with the use of IV/oral pain medications. Discharge plans were discussed with the patient with the aid of . Consults: Hospitalist    Disposition: Home    Patient Instructions:     Medications for pain:    Acetaminophen - Take one tablet every 6 hours for 1 week. Then take every 6 hours as needed for pain. Tramadol - Take one tablet every 6 hours for 1 week. Then take every 6 hours as needed for pain. Oxycodone 1 tablet every 4 hours as needed for pain. You can take 2 every 6 hours for severe pain. Medication for DVT prophylaxis (Prevention of blood clots)  Aspirin - Take 1 tablet daily for 6 weeks for prevention of blood clots    Medication for constipation:  Colace - Take 1 tablet every 12 hours as needed for constipation        Activity: activity as tolerated  Diet: regular diet  Wound Care: Patient should remove dressing in 9 days. Patient can shower with the dressing on but should not bathe. After removing, the dressing keep incision clean and dry    Follow-up with Dr. Ulysses Fox in 2 weeks.

## 2020-10-14 NOTE — OP NOTE
Operative Report  Channing Bonds  [id number]  10/14/2020    Pre-operative diagnosis:    1. Right knee degenerative joint disease      2. Rheumatoid arthritis right knee    Post-operative diagnosis:   1. Right knee degenerative joint disease      2. Rheumatoid arthritis right knee    Procedure: Allan Right Total Knee Arthroplasty    Surgeon: Eloisa Jensen MD    Assistant:  Maeve Cruz    Anesthesia:  Spinal     Operative Indication: Di Yap is a 45 y/o female who presents with right knee osteoarthritis. The pain has become a quality of life issue and the patient has failed nonoperative treatment. She presents for a right total knee arthroplasty. The risks and benefits of surgery were discussed and all questions were answered. The risks for surgery that were discussed include bleeding, infection, damage to blood vessels, damage to nerves, risk of further surgery, restricted range of motion, risk of continued discomfort, risk of need for further reconstructive procedures, risk of need for altered activities and altered gait, risk of blood clots, pulmonary embolism, myocardial infarction, and risk of death were discussed. The patient understood these risks and consented for surgery. The typical post-operative recovery process was also discussed. EBL: 100 cc ml    Tourniquet time: 85 min    Complications: None    Components: 1. [place] triatholon femur, size 3, CR, press fit                         2. Ashlee triatholon tibia, size 3, press fit    3. Poly 10 mm     4. Patella, size 31, press fit      Operative Procedure: The patient was taken to the operating room and was given spinal anaesthesia. The patient was properly positioned on the table and a tourniquet was applied to the upper thigh. The leg was then prepped and draped in the usual sterile fashion. A timeout was called identifying the patient, prodedure and the adminstration of IV Ancef and TXA.     A straight midline incision was made through the skin and subcutaneous tissue. A medial parapatellar arthrotomy was used. The anterior synovium and fat pad were excised and the proximal tibia was exposed. The ACL and medial and lateral meniscus were transected. 2 Karyna pins were then placed in the distal femur. A Allan satellite array was then affixed. 2 additional small skin incisions were made over the proximal tibia and 2 Karyna pins were placed. A Allan satellite array was then affixed. At this point, 2 check points were placed on the femur and tibia. Anatomical landmarks on the ankle were registered. The femur and tibia were then registered and verified. The patient was noted to have a 18° varus deformity 35° flexion contracture. Osteophytes were then removed with a rongeur. With valgus and varus stresses applied, poses were captured at 0° 90°. Small modifications were made to implant position using the [facility] software to create balanced flexion and extension gaps. The FLIP4NEW robot was then used to make cuts in the distal femur, anterior and poster chamfer, anterior femur, posterior condyles and proximal tibia. The medial and lateral meniscus was then excised. The knee was then trialed with a size 3 femur and tibia and a 10 mm poly. The knee felt balanced in flexion and extension and this was confirmed with the [address]. A patellar osteotomy was performed with the patellar reamer and cleaned up with a saw. A size 31 patella was then selected. All trial components were removed and the knee was thoroughly irrigated with normal saline. The implants were then press fit. A size 10 trial poly was used and the knee was again found to be balanced in flexion and extension. The poly was then inserted. The knee was then thoroughly irrigated with normal saline. The [place] cocktail was then injected into the soft tissues prior to closure. The wound was then closed with 1, 2-0, 3-0 Stratofix, dermabond and steri strips.  A sterile dressing was then applied and the knee was wrapped with an ACE wrap. The tourniquet was then dropped. There were no complications and the patient tolerated the procedure well. The patient was taken to the recovery room in stable condition.     Kristy Cranker, MD

## 2020-10-14 NOTE — CARE COORDINATION
Social Work:    Fay Perkins is undergoing a right TKA today. Social service met with Fay Perkins in ortho camp on October 6th and provided her with ortho discharge information. Fya Perkins was made aware of goals for return home with RUTHIE Mckeon, and expected hospital length of stay. Fay Perkins resides in a two-story home with her bedroom & bath located upstairs. She has a wheeled walker, case, BSC, and possibly an ETB. Fay Perkins prefers [facility], after Choices were provided. Vandana at [facility] is following.     Electronically signed by LEEANNA Baumann on 10/14/2020 at 1:53 PM

## 2020-10-14 NOTE — ANESTHESIA PROCEDURE NOTES
Spinal Block    Patient location during procedure: OR  Start time: 10/14/2020 1:00 PM  End time: 10/14/2020 1:10 PM  Reason for block: post-op pain management, primary anesthetic and at surgeon's request  Staffing  Anesthesiologist: Noe Ruiz MD  Resident/CRNA: Faby Barber MD  Performed: anesthesiologist   Preanesthetic Checklist  Completed: patient identified, site marked, surgical consent, pre-op evaluation, timeout performed, IV checked, risks and benefits discussed, monitors and equipment checked, anesthesia consent given, oxygen available and patient being monitored  Spinal Block  Patient position: sitting  Prep: ChloraPrep  Patient monitoring: cardiac monitor, continuous pulse ox and continuous capnometry  Approach: midline  Location: L3/L4  Procedures: paresthesia technique  Provider prep: mask, sterile gloves and sterile gown  Local infiltration: lidocaine  Dose: 0.5  Agent: bupivacaine  Adjuvant: fentanyl  Dose: 1.8  Dose: 1.8  Needle  Needle type: Pencan   Needle gauge: 25 G  Needle length: 3.5 in  Assessment  Sensory level: T4  Events: cerebrospinal fluid  Swirl obtained: Yes  CSF: clear  Attempts: 2  Hemodynamics: stable

## 2020-10-14 NOTE — PROGRESS NOTES
Nursing Transfer Note    Data:  Summary of patients progress: Dr aMme Jack right total knee, spinal, PNB  Reason for transfer: next level of care    Action:  Explained reason for transfer to Patient and Family. Report given to: Cori MART, using RN Handoff Navigator.   Mode of transportation: bed    Response:  RN Recommendations: pain mgt

## 2020-10-14 NOTE — INTERVAL H&P NOTE
Update History & Physical    H&P reviewed. No changes. Plan: The risks, benefits, expected outcome, and alternative to the recommended procedure have been discussed with the patient. Patient understands and wants to proceed with the procedure.      Electronically signed by Terrie Cowden, MD on 10/14/2020 at 9:15 AM

## 2020-10-14 NOTE — PLAN OF CARE
Problem: Falls - Risk of:  Goal: Will remain free from falls  Description: Will remain free from falls  Outcome: Met This Shift     Problem: Pain:  Goal: Pain level will decrease  Description: Pain level will decrease  Outcome: Met This Shift     Problem: Mobility - Impaired:  Goal: Mobility will improve  Description: Mobility will improve  Outcome: Met This Shift     Problem: Infection - Surgical Site:  Goal: Will show no infection signs and symptoms  Description: Will show no infection signs and symptoms  Outcome: Met This Shift     Problem: Pain - Acute:  Goal: Pain level will decrease  Description: Pain level will decrease  Outcome: Met This Shift     Problem:  Activity:  Goal: Ability to tolerate increased activity will improve  Description: Ability to tolerate increased activity will improve  Outcome: Met This Shift

## 2020-10-14 NOTE — ANESTHESIA PROCEDURE NOTES
Peripheral Block    Patient location during procedure: procedure area  Staffing  Anesthesiologist: Jesús Sarabia MD  Performed: anesthesiologist   Preanesthetic Checklist  Completed: patient identified, site marked, surgical consent, pre-op evaluation, timeout performed, IV checked, risks and benefits discussed, monitors and equipment checked, anesthesia consent given, oxygen available and patient being monitored  Peripheral Block  Patient position: supine  Prep: ChloraPrep  Patient monitoring: frequent blood pressure checks, IV access and cardiac monitor  Block type: Femoral  Laterality: right  Injection technique: single-shot  Procedures: ultrasound guided  Adductor canal  Provider prep: sterile gloves and mask  Needle  Needle gauge: 21 G  Needle length: 8 cm  Needle localization: ultrasound guidance  Test dose: negative  Assessment  Injection assessment: negative aspiration for heme, no paresthesia on injection and local visualized surrounding nerve on ultrasound  Paresthesia pain: none  Slow fractionated injection: yes  Hemodynamics: stable  Medications Administered  Ropivacaine (NAROPIN) 0.5% injection, 30 mL  Reason for block: post-op pain management and at surgeon's request

## 2020-10-14 NOTE — CONSULTS
[address] for Medical Management      Reason for Consult:  Medical management    History of Present Illness:     39-year-old female who is being seen by hospitalist service for consultation for medical management. Patient has worsening right knee pain, greater than 2 months duration, imaging found to have degenerative joint disease and patient underwent right knee arthroplasty. Patient also has history of rheumatoid arthritis and is on chronic medications for this. Other medical history of hypertension and hypothyroidism. Informant(s) for H&P: Patient  REVIEW OF SYSTEMS:  A comprehensive review of systems was negative except for: what is in the HPI       PMH:  Past Medical History:   Diagnosis Date    Hypertension     Rheumatoid arthritis ([facility] [address]     follows with DR. Carlos Alberto Sharma Thyroid disease     Urinary urgency        Surgical History:  Past Surgical History:   Procedure Laterality Date    FRACTURE SURGERY Left 1997    knee / insertion of hardware       Medications Prior to Admission:    Prior to Admission medications    Medication Sig Start Date End Date Taking? Authorizing Provider   hydroxychloroquine (PLAQUENIL) 200 MG tablet Take by mouth 2 times daily   Yes Historical Provider, MD   oxybutynin (DITROPAN-XL) 5 MG extended release tablet Take 5 mg by mouth 2 times daily   Yes Historical Provider, MD   hydroCHLOROthiazide (HYDRODIURIL) 25 MG tablet Take 25 mg by mouth daily   Yes Historical Provider, MD   levothyroxine (SYNTHROID) 25 MCG tablet Take 25 mcg by mouth Daily   Yes Historical Provider, MD   meloxicam (MOBIC) 15 MG tablet Take 15 mg by mouth daily LD 10/8/2020   Yes Historical Provider, MD       Allergies:    Patient has no known allergies. Social History:    reports that she has never smoked. She has never used smokeless tobacco. She reports that she does not drink alcohol or use drugs. Family History:   family history is not on file. using voice recognition software. Every effort was made to ensure accuracy; however, inadvertent computerized transcription errors may be present.   Electronically signed by Rosana Lesches, MD on 10/14/2020 at 7:21 PM

## 2020-10-15 VITALS
HEIGHT: 65 IN | RESPIRATION RATE: 16 BRPM | SYSTOLIC BLOOD PRESSURE: 129 MMHG | WEIGHT: 266 LBS | OXYGEN SATURATION: 96 % | BODY MASS INDEX: 44.32 KG/M2 | TEMPERATURE: 96.9 F | DIASTOLIC BLOOD PRESSURE: 76 MMHG | HEART RATE: 84 BPM

## 2020-10-15 LAB
ANION GAP SERPL CALCULATED.3IONS-SCNC: 9 MMOL/L (ref 7–16)
BUN BLDV-MCNC: 16 MG/DL (ref 6–20)
CALCIUM SERPL-MCNC: 8.8 MG/DL (ref 8.6–10.2)
CHLORIDE BLD-SCNC: 104 MMOL/L (ref 98–107)
CO2: 24 MMOL/L (ref 22–29)
CREAT SERPL-MCNC: 0.9 MG/DL (ref 0.5–1)
GFR AFRICAN AMERICAN: >60
GFR NON-AFRICAN AMERICAN: >60 ML/MIN/1.73
GLUCOSE BLD-MCNC: 129 MG/DL (ref 74–99)
HCT VFR BLD CALC: 37.7 % (ref 34–48)
HEMOGLOBIN: 12.3 G/DL (ref 11.5–15.5)
MCH RBC QN AUTO: 30.1 PG (ref 26–35)
MCHC RBC AUTO-ENTMCNC: 32.6 % (ref 32–34.5)
MCV RBC AUTO: 92.4 FL (ref 80–99.9)
PDW BLD-RTO: 13.8 FL (ref 11.5–15)
PLATELET # BLD: 232 E9/L (ref 130–450)
PMV BLD AUTO: 10.7 FL (ref 7–12)
POTASSIUM REFLEX MAGNESIUM: 3.6 MMOL/L (ref 3.5–5)
RBC # BLD: 4.08 E12/L (ref 3.5–5.5)
SODIUM BLD-SCNC: 137 MMOL/L (ref 132–146)
WBC # BLD: 14.2 E9/L (ref 4.5–11.5)

## 2020-10-15 PROCEDURE — 97165 OT EVAL LOW COMPLEX 30 MIN: CPT

## 2020-10-15 PROCEDURE — 97530 THERAPEUTIC ACTIVITIES: CPT

## 2020-10-15 PROCEDURE — 96375 TX/PRO/DX INJ NEW DRUG ADDON: CPT

## 2020-10-15 PROCEDURE — 99214 OFFICE O/P EST MOD 30 MIN: CPT | Performed by: INTERNAL MEDICINE

## 2020-10-15 PROCEDURE — 96376 TX/PRO/DX INJ SAME DRUG ADON: CPT

## 2020-10-15 PROCEDURE — 6360000002 HC RX W HCPCS: Performed by: ORTHOPAEDIC SURGERY

## 2020-10-15 PROCEDURE — 97161 PT EVAL LOW COMPLEX 20 MIN: CPT

## 2020-10-15 PROCEDURE — 85027 COMPLETE CBC AUTOMATED: CPT

## 2020-10-15 PROCEDURE — 97535 SELF CARE MNGMENT TRAINING: CPT

## 2020-10-15 PROCEDURE — 96374 THER/PROPH/DIAG INJ IV PUSH: CPT

## 2020-10-15 PROCEDURE — G0378 HOSPITAL OBSERVATION PER HR: HCPCS

## 2020-10-15 PROCEDURE — 6370000000 HC RX 637 (ALT 250 FOR IP): Performed by: ORTHOPAEDIC SURGERY

## 2020-10-15 PROCEDURE — 2580000003 HC RX 258: Performed by: ORTHOPAEDIC SURGERY

## 2020-10-15 PROCEDURE — 36415 COLL VENOUS BLD VENIPUNCTURE: CPT

## 2020-10-15 PROCEDURE — 80048 BASIC METABOLIC PNL TOTAL CA: CPT

## 2020-10-15 RX ORDER — OXYCODONE HYDROCHLORIDE 5 MG/1
5 TABLET ORAL EVERY 4 HOURS PRN
Qty: 42 TABLET | Refills: 0 | Status: SHIPPED | OUTPATIENT
Start: 2020-10-15 | End: 2020-10-22

## 2020-10-15 RX ORDER — TRAMADOL HYDROCHLORIDE 50 MG/1
50 TABLET ORAL EVERY 6 HOURS
Qty: 28 TABLET | Refills: 0 | Status: SHIPPED | OUTPATIENT
Start: 2020-10-15 | End: 2020-10-22

## 2020-10-15 RX ORDER — ACETAMINOPHEN 325 MG/1
650 TABLET ORAL EVERY 6 HOURS
Qty: 120 TABLET | Refills: 3 | Status: SHIPPED | OUTPATIENT
Start: 2020-10-15 | End: 2021-01-06 | Stop reason: ALTCHOICE

## 2020-10-15 RX ORDER — SENNA AND DOCUSATE SODIUM 50; 8.6 MG/1; MG/1
1 TABLET, FILM COATED ORAL 2 TIMES DAILY
Qty: 30 TABLET | Refills: 0 | Status: SHIPPED | OUTPATIENT
Start: 2020-10-15 | End: 2021-01-06 | Stop reason: ALTCHOICE

## 2020-10-15 RX ORDER — DEXAMETHASONE SODIUM PHOSPHATE 4 MG/ML
10 INJECTION, SOLUTION INTRA-ARTICULAR; INTRALESIONAL; INTRAMUSCULAR; INTRAVENOUS; SOFT TISSUE ONCE
Status: COMPLETED | OUTPATIENT
Start: 2020-10-15 | End: 2020-10-15

## 2020-10-15 RX ADMIN — DOCUSATE SODIUM 50 MG AND SENNOSIDES 8.6 MG 1 TABLET: 8.6; 5 TABLET, FILM COATED ORAL at 08:24

## 2020-10-15 RX ADMIN — SODIUM CHLORIDE, PRESERVATIVE FREE 10 ML: 5 INJECTION INTRAVENOUS at 08:24

## 2020-10-15 RX ADMIN — ACETAMINOPHEN 650 MG: 325 TABLET ORAL at 00:16

## 2020-10-15 RX ADMIN — OXYBUTYNIN CHLORIDE 5 MG: 5 TABLET, EXTENDED RELEASE ORAL at 08:24

## 2020-10-15 RX ADMIN — TRAMADOL HYDROCHLORIDE 50 MG: 50 TABLET, FILM COATED ORAL at 00:16

## 2020-10-15 RX ADMIN — KETOROLAC TROMETHAMINE 30 MG: 30 INJECTION, SOLUTION INTRAMUSCULAR; INTRAVENOUS at 08:25

## 2020-10-15 RX ADMIN — KETOROLAC TROMETHAMINE 30 MG: 30 INJECTION, SOLUTION INTRAMUSCULAR; INTRAVENOUS at 04:08

## 2020-10-15 RX ADMIN — HYDROXYCHLOROQUINE SULFATE 200 MG: 200 TABLET ORAL at 08:24

## 2020-10-15 RX ADMIN — CEFAZOLIN 3 G: 10 INJECTION, POWDER, FOR SOLUTION INTRAVENOUS at 04:08

## 2020-10-15 RX ADMIN — ACETAMINOPHEN 650 MG: 325 TABLET ORAL at 06:08

## 2020-10-15 RX ADMIN — ACETAMINOPHEN 650 MG: 325 TABLET ORAL at 11:29

## 2020-10-15 RX ADMIN — TRAMADOL HYDROCHLORIDE 50 MG: 50 TABLET, FILM COATED ORAL at 11:29

## 2020-10-15 RX ADMIN — TRAMADOL HYDROCHLORIDE 50 MG: 50 TABLET, FILM COATED ORAL at 06:08

## 2020-10-15 RX ADMIN — LEVOTHYROXINE SODIUM 25 MCG: 25 TABLET ORAL at 06:08

## 2020-10-15 RX ADMIN — ASPIRIN 325 MG: 325 TABLET, COATED ORAL at 08:24

## 2020-10-15 RX ADMIN — DEXAMETHASONE SODIUM PHOSPHATE 10 MG: 4 INJECTION, SOLUTION INTRAMUSCULAR; INTRAVENOUS at 08:24

## 2020-10-15 RX ADMIN — HYDROCHLOROTHIAZIDE 25 MG: 25 TABLET ORAL at 08:24

## 2020-10-15 ASSESSMENT — PAIN SCALES - GENERAL
PAINLEVEL_OUTOF10: 0

## 2020-10-15 NOTE — CARE COORDINATION
Case Management: Social Work Case Management Initial Assessment  Luzmaria Santiago,         Met with:patient  Information verified: address, contacts, phone number, , insurance Yes  PCP: Marco Antonio Johns MD  Pharmacy: No Pharmacies Listed       Discharge Planning  Patient Status Order: Observation Date: 10-  Readmission within last 30 days:  no  Current Residence: Private Residence  Living Arrangements:  Family Members   Home Access: two-story/bed/bath on 2nd floor  Entrance Stairs-Number of Steps:one entry  Support Systems:  Family Members  Current Services PTA: None Supplier: n/a  Patient able to perform ADL's: will need assistance  DME used to aid ambulation prior to admission: [employer], [employer], ET[employer]    Potential Assistance Needed:  N/A  Potential Assistance Purchasing Medications:  No  Does patient want to participate in local refill/meds to beds program?: Yes    Patient agreeable to home care: Yes  Type of Home Care Services:  OT, PT, Nursing Services  Patient expects to be discharged to:  home    Prior SNF/Rehab Placement and Facility: no  Agreeable to SNF/Rehab: No    Choices given to patient: yes    Expected Discharge date:  10/15/20  Follow Up Appointment: Best Day/ Time: Monday AM    Social Work Assessment/Plan: Social service met with Gareth Brock this a.m. and confirmed plans for home today. Gareth Brock requested a quad-cane and has a [employer], [employer], ETB. Social service called a referral to REJI NELSON at [facility] for delivery to patient's room today. Vandana at [facility] is aware of expected discharge today.        Electronically signed by LEEANNA Beaulieu on 10/15/20 at 8:23 AM EDT

## 2020-10-15 NOTE — PROGRESS NOTES
CLINICAL PHARMACY NOTE: MEDS TO [address] Select Patient?: No  Total # of Prescriptions Filled: 4   The following medications were delivered to the patient:  · Acetaminophen 325 mg  · Tramadol 50 mg  · Oxycodone   · Stimulant 8.6- 50     Total # of Interventions Completed: 2  Time Spent (min): 45    Additional Documentation:

## 2020-10-15 NOTE — PROGRESS NOTES
fit of her tennis shoes to avoid need to tie/untie them at home. Pt provided with pictured instructions for use of SONNY hose sleeve as pt struggled with this task. Pt reports her mother is available to help if needed but pt prefers to complete things for herself. Increased time and trials for proper technique in tub transfer with clamp on grab bar. Pt has good follow through by end of training. Educated to have mother or therapist present with her for safety during initial completion of tub transfer with verbalized understanding. Educated patient to keep [facility] on 1st floor initially for safety with good understanding. All questions/concerns addressed. Treatment: Patient educated on techniques for completion of ADL, safe functional transfers and functional mobility. Patient required cues for follow through with proper hand/foot placement, pacing, safety, precautions and technique in bed mobility, functional transfers, functional mobility, toileting, bathing and LB dressing in preparation for maximum independence in all self care tasks.      Hand Dominance: Right []  Left []   Strength ROM Additional Info:    RUE  4/5 WFL good  and FMC/dexterity noted during ADL tasks     LUE 4/5 WFL good  and FMC/dexterity noted during ADL tasks         Hearing: WFL   Vision: WFL   Sensation:  No c/o numbness or tingling   Tone: WFL                             Long Term Goal (1-3 wks): Pt will maximize functional performance in all self care tasks/functional transfers with good follow through of all trained techniques for safe transition to next level of care    Eval Complexity: Low    Assessment of current deficits   Functional mobility [x]  ADLs [x] Strength [x]  Cognition []  Functional transfers  [x] IADLs [x] Safety Awareness [x]  Endurance [x]  Fine Motor Coordination [] Balance [x] Vision/perception [] Sensation []   Gross Motor Coordination [] ROM [] Delirium []                  Motor Control []    Plan of Care:   [x] ADL retraining/AE recommendations specific to diagnosis of R TKA  [x] Energy Conservation Techniques/Strategies      [] Neuromuscular Re-Education      [x] Functional Transfer Training         [x] Functional Mobility Training          [] Cognitive Re-Training         [] Splinting/Positioning Needs           [x] Therapeutic Activity   [x]Therapeutic Exercise   [] Visual/Perceptual  [x] Delirium Prevention/Treatment   [x] Positioning to Improve Functional [place], Safety, and Skin Integrity   [x] Patient and/or Family Education to Increase Safety and Functional Mohave   [] Other:     Rehab Potential: Good for established goals     Patient / Family Goal: To get home. Patient and/or family were instructed on functional diagnosis, prognosis/goals and OT plan of care. Pt verbalized understanding. Upon arrival, patient supine in bed. At end of session, patient seated on mat table with PT present. Pt would benefit from continued skilled OT to increase safety and independence with completion of ADL/IADL tasks for functional independence and quality of life.     Low Evaluation  Time In: 835   Time Out: 911       Min Units   Therapeutic Ex 84990     Therapeutic Activities 77383     ADL/Self Care 85319 25 2   Orthotic Management 44456     Neuro Re-Ed 01264     Non-Billable Time     TOTAL TIMED TREATMENT 25 2         Evaluation time includes thorough review of current medical information, gathering information on past medical history/social history and prior level of function, completion of standardized testing/informal observation of tasks, assessment of data, and development of POC/Goals    Chidi Ha OTR/L  [id number]

## 2020-10-15 NOTE — PROGRESS NOTES
Orthopaedic Surgery Progress Note  VICKEY Titus MD      SUBJECTIVE:  No acute events over night. Pain controlled. Denies chest pain or shortness of breath. OBJECTIVE:   AAOx3, NAD    Right lower extremity    Dressings C/D/I  SILT L1-S1  TA/EHL/GS intact  Calf soft, NT  +2 DP, W/WP     Data:  CBC:   Lab Results   Component Value Date    WBC 14.2 10/15/2020    RBC 4.08 10/15/2020    HGB 12.3 10/15/2020    HCT 37.7 10/15/2020    MCV 92.4 10/15/2020    MCH 30.1 10/15/2020    MCHC 32.6 10/15/2020    RDW 13.8 10/15/2020     10/15/2020    MPV 10.7 10/15/2020     BMP:    Lab Results   Component Value Date     10/15/2020    K 3.6 10/15/2020     10/15/2020    CO2 24 10/15/2020    BUN 16 10/15/2020    CREATININE 0.9 10/15/2020    CALCIUM 8.8 10/15/2020    GFRAA >60 10/15/2020    LABGLOM >60 10/15/2020    GLUCOSE 129 10/15/2020         A/P: POD 1 right TKA  - WBAT  - Pain control  - PT/OT  - DVT prophylaxis  - D/C planning for home today    VICKEY Titus MD

## 2020-10-15 NOTE — PROGRESS NOTES
[facility] Progress Note    Admitting Date and Time: 10/14/2020  8:11 AM  Admit Dx: Primary osteoarthritis of right knee [M17.11]    Subjective:  Patient is being followed for Primary osteoarthritis of right knee [M17.11]   Pt feels fine, right knee with some soreness controlled with Norco    ROS: denies fever, chills, cp, sob, n/v, HA unless stated above.       hydroCHLOROthiazide  25 mg Oral Daily    hydroxychloroquine  200 mg Oral BID    levothyroxine  25 mcg Oral Daily    oxybutynin  5 mg Oral BID    sodium chloride flush  10 mL Intravenous 2 times per day    acetaminophen  650 mg Oral Q6H    sennosides-docusate sodium  1 tablet Oral BID    aspirin  325 mg Oral Daily    ketorolac  30 mg Intravenous Q6H    traMADol  50 mg Oral Q6H     sodium chloride flush, 10 mL, PRN  oxyCODONE, 5 mg, Q4H PRN    Or  oxyCODONE, 10 mg, Q4H PRN  magnesium hydroxide, 30 mL, Daily PRN  ondansetron, 4 mg, Q6H PRN         Objective:    /81   Pulse 72   Temp 99 °F (37.2 °C) (Oral)   Resp 16   Ht 5' 5\" (1.651 m)   Wt 266 lb (120.7 kg)   SpO2 94%   BMI 44.26 kg/m²     General Appearance: alert and oriented to person, place and time and in no acute distress  Skin: warm and dry  Head: normocephalic and atraumatic  Eyes: pupils equal, round, and reactive to light, extraocular eye movements intact, conjunctivae normal  Neck: neck supple and non tender without mass   Pulmonary/Chest: clear to auscultation bilaterally- no wheezes, rales or rhonchi, normal air movement, no respiratory distress  Cardiovascular: normal rate, normal S1 and S2 and no carotid bruits  Abdomen: soft, non-tender, non-distended, normal bowel sounds, no masses or organomegaly  Extremities: no cyanosis, no clubbing and no edema, right knee in dressing very mild tenderness  Neurologic: no cranial nerve deficit and speech normal        Recent Labs     10/15/20  0530      K 3.6      CO2 24   BUN 16   CREATININE 0.9 GLUCOSE 129*   CALCIUM 8.8       Recent Labs     10/15/20  0530   WBC 14.2*   RBC 4.08   HGB 12.3   HCT 37.7   MCV 92.4   MCH 30.1   MCHC 32.6   RDW 13.8      MPV 10.7       Assessment:    Active Problems:    Primary osteoarthritis of right knee  Resolved Problems:    * No resolved hospital problems. *      Plan:  1. Degenerative joint disease, status post right total knee arthroplasty, postop day 1, postop care with incentive spirometry, PT OT, DVT prophylaxis and pain management. 2.  History of rheumatoid arthritis, continue on home hydroxychloroquine. 3.  History of hypothyroidism we will continue levothyroxine. 4.  History of hypertension, blood pressures controlled, continue on hydrochlorothiazide      NOTE: This report was transcribed using voice recognition software. Every effort was made to ensure accuracy; however, inadvertent computerized transcription errors may be present.   Electronically signed by Coreen Mancini MD on 10/15/2020 at 11:04 AM

## 2020-10-15 NOTE — PROGRESS NOTES
Physical Therapy    Facility/Department: [facility] SURGERY  Initial Assessment    NAME: Serge Zheng  : 1970  MRN: [id number]    Date of Service: 10/15/2020       REQUIRES PT FOLLOW UP: Yes       Patient Diagnosis(es): The primary encounter diagnosis was Preop testing. A diagnosis of Primary osteoarthritis of right knee was also pertinent to this visit. has a past medical history of Hypertension, Rheumatoid arthritis ([address] Thyroid disease, and Urinary urgency. has a past surgical history that includes fracture surgery (Left, ) and Total knee arthroplasty (Right, 10/14/2020). Evaluating Therapist: Holley Sandhoff, PT       rec ww    Referring Provider:  Dr. Sanchez Getting #: [phone]  DIAGNOSIS:  OA s/p R TKA 10/14/2020  PRECAUTIONS: falls, FWBAT     Social:  Pt lives with  mom in a  2  floor plan  1  steps  Enter. 1 HR on steps to second floor   Prior to admission pt walked with  No AD. Has cane      Initial Evaluation  Date: 10/15/2020 Treatment      Short Term/ Long Term   Goals   Was pt agreeable to Eval/treatment? yes      Does pt have pain?  minimal R knee      Bed Mobility  Rolling: NT  Supine to sit:  NT   Sit to supine:  SBA   Scooting:  Independent    independent    Transfers Sit to stand:  SBA   Stand to sit:  SBA   Stand pivot:  SBA    independent    Ambulation    100 feet and 200 feet x 1  with  ww  with  SBA    22 feet with ww  with  S/I       Stair negotiation: ascended and descended 4  steps with  B  rail with  SBA  4 steps with 1 HR and cane SBA /CGA    4  steps with  1 rail with  Cane SBA    LE ROM  AAROM R knee 5-80 degrees     LE strength  4-/ 5 R knee in available range     AM- PAC RAW score  18/ 24            Pt is alert and Oriented x  4      Balance: SBA   Endurance: WFl   Bed/Chair alarm:  no     ASSESSMENT  Pt displays functional ability as noted in the objective portion of this evaluation. Treatment/Education:     Mobility as above.  Cues for hand and foot placement with transfers . Instructed in and demonstrated proper car transfer technique. Cues for sequencing and technique on steps     Pt educated on fall risk,  LE exercises, safe and proper technique with mobility        Patient response to education:   Pt verbalized understanding Pt demonstrated skill Pt requires further education in this area    x  with cues  x       Comments:  Pt left  In bed per pt request after session, with call light in reach. Rehab potential is Good for reaching above PT goals. Pts/ family goals   1. Home     Patient and or family understand(s) diagnosis, prognosis, and plan of care. -  Yes     PLAN  PT care will be provided in accordance with the objectives noted above. Whenever appropriate, clear delegation orders will be provided for nursing staff. Exercises and functional mobility practice will be used as well as appropriate assistive devices or modalities to obtain goals. Patient and family education will also be administered as needed. PLAN OF CARE:    Current Treatment Recommendations     [x] Strengthening     [x] ROM   [x] Balance Training   [x] Endurance Training   [x] Transfer Training   [x] Gait Training   [x] Stair Training   [x] Positioning   [x] Safety and Education Training   [x] Patient/Caregiver Education   [x] HEP  [] Other       Frequency of treatments will be BID x 1-2 days. Time in: 0902   Time out:  0930       Evaluation Time includes thorough review of current medical information, gathering information on past medical history/social history and prior level of function, completion of standardized testing/informal observation of tasks, assessment of data and education on plan of care and goals.     CPT codes:  [x] Low Complexity PT evaluation 06012  [] Moderate Complexity PT evaluation 98485  [] High Complexity PT evaluation 39807  [] PT Re-evaluation 47901  [] Gait training 54680  minutes  [x] Therapeutic activities 07138 20  minutes  []

## 2020-10-15 NOTE — PLAN OF CARE
Problem: Falls - Risk of:  Goal: Will remain free from falls  Description: Will remain free from falls  10/15/2020 0215 by Chasity Pratt RN  Outcome: Ongoing     Problem: Pain:  Goal: Pain level will decrease  Description: Pain level will decrease  10/15/2020 0215 by Chasity Pratt RN  Outcome: Ongoing

## 2020-10-15 NOTE — PROGRESS NOTES
Physical Therapy  Attempted PT PM session. Pt reports she is discharged and her ride is on the way.  Adjusted QC to proper  Height per pt request. Pt had no other home going needs related to PT.

## 2020-10-15 NOTE — PROGRESS NOTES
[facility] Quality Flow/Interdisciplinary Rounds Progress Note        Quality Flow Rounds held on October 15, 2020    Disciplines Attending:  Bedside Nurse, ,  and Nursing Unit Leadership    Alfonso Twin was admitted on 10/14/2020  8:11 AM    Anticipated Discharge Date:  Expected Discharge Date: 10/15/20    Disposition:    Maximino Score:  Maximino Scale Score: 20    Readmission Risk              Risk of Unplanned Readmission:        0           Discussed patient goal for the day, patient clinical progression, and barriers to discharge.   The following Goal(s) of the Day/Commitment(s) have been identified:  Discharge [address] [place]  October 15, 2020

## 2020-10-16 NOTE — ANESTHESIA POSTPROCEDURE EVALUATION
Department of Anesthesiology  Postprocedure Note    Patient: Madisyn Drake  MRN: [id number]  YOB: 1970  Date of evaluation: 10/16/2020  Time:  6:57 AM     Procedure Summary     Date:  10/14/20 Room / Location:  [facility] OR 02 / [facility]    Anesthesia Start:  5710 Anesthesia Stop:  1591    Procedure:  TRELL ROBOTIC ASSISTED  RIGHT KNEE TOTAL ARTHROPLASTY (Right Knee) Diagnosis:  (OSTEOARTHRITIS)    Surgeon:  Carly Redding MD Responsible Provider:  Bernie Roas MD    Anesthesia Type:  general, spinal, other ASA Status:  3          Anesthesia Type: general, spinal, other    Jeri Phase I: Jeri Score: 10    Jeri Phase II:      Last vitals: Reviewed and per EMR flowsheets.        Anesthesia Post Evaluation    Patient location during evaluation: PACU  Patient participation: complete - patient participated  Level of consciousness: awake  Pain score: 2  Airway patency: patent  Nausea & Vomiting: no nausea  Complications: no  Cardiovascular status: blood pressure returned to baseline  Respiratory status: acceptable  Hydration status: euvolemic

## 2021-01-06 NOTE — PROGRESS NOTES
Have you been tested for COVID  No   Scheduled for COVID test 1-8-21 for OR scheduled 1-13-21          Have you been told you were positive for COVID No  Have you had any known exposure to someone that is positive for COVID No  Do you have a cough                   No              Do you have shortness of breath No                 Do you have a sore throat            No                Are you having chills                    No                Are you having muscle aches. No                    Please come to the hospital wearing a mask and have your significant other wear a mask as well. Both of you should check your temperature before leaving to come here,  if it is 100 or higher please call [phone] for instruction. [facility] PRE-ADMISSION [facility]ING IN[facility]CTIONS    The Preadmission Testing patient is instructed accordingly using the following criteria (check applicable):    ARRIVAL INSTRUCTIONS:  [x] Parking the day of Surgery is located in the Main Entrance lot. Upon entering the door, make an immediate right to the surgery reception desk    [x] Bring photo ID and insurance card    [] Bring in a copy of Living will or Durable Power of  papers.     [x] Please be sure to arrange for responsible adult to provide transportation to and from the hospital    [x] Please arrange for responsible adult to be with you for the 24 hour period post procedure due to having anesthesia      GENERAL INSTRUCTIONS:    [x] Nothing by mouth after midnight, including gum, candy, mints or water    [x] You may brush your teeth, but do not swallow any water    [] Take medications as instructed with 1-2 oz of water    [x] Stop herbal supplements and vitamins 5 days prior to procedure    [x] Follow preop dosing of blood thinners per physician instructions    [] Take 1/2 dose of evening insulin, but no insulin after midnight    [] No oral diabetic medications after midnight [] Shower with soap, lather and rinse well, and use CHG wipes provided the evening before surgery as instructed    [] Incentive spirometer with instructions

## 2021-01-08 ENCOUNTER — HOSPITAL ENCOUNTER (OUTPATIENT)
Age: 51
Discharge: HOME | End: 2021-01-10
Payer: COMMERCIAL

## 2021-01-08 DIAGNOSIS — Z01.818 PREOP TESTING: ICD-10-CM

## 2021-01-08 PROCEDURE — U0003 INFECTIOUS AGENT DETECTION BY NUCLEIC ACID (DNA OR RNA); SEVERE ACUTE RESPIRATORY SYNDROME CORONAVIRUS 2 (SARS-COV-2) (CORONAVIRUS DISEASE [COVID-19]), AMPLIFIED PROBE TECHNIQUE, MAKING USE OF HIGH THROUGHPUT TECHNOLOGIES AS DESCRIBED BY CMS-2020-01-R: HCPCS

## 2021-01-11 LAB
SARS-COV-2: NOT DETECTED
SOURCE: NORMAL

## 2021-01-12 NOTE — H&P
History and Physical  K. Theodore Estevez MD        Chief Complaint     Renetta Scott returns for preoperative assessment of left knee. Date of last x-ray was 12/15/2020. Her current pain level is a 6/10. She reports symptoms of stiffness and pain with activities. She is not taking medication for pain . She arrived today with no assistance. Previous Treatment: home exercise program  Additional Comments: Renetta Scott returns to the office 3 months status post right TKA. She says it is doing well. She is scheduled for a hardware removal of her left tibia on 1/13/21 followed by a left TKA 2 months later. Currently pain is 0/10 in the right knee and 6/10 in the left. She takes nothing for pain.      Physical Exam   General Appearance:   Awake, alert, oriented x3  Well developed, well nourished  Obese  No acute distress  Eyes appear Normal    Respiratory:       Respiratory effort: non-labored    Cardiovascular:   Edema: absent      Varicosities: absent    Lymphatic/Skin:       Skin Appearance: Normal              Right Knee Exam   Skin Exam:   skin intact, incision healed  Effusion:   none  Alignment:   neutral  ROM:   0-115  Laxity with varus/valgus stress:   <5 degrees    Left Knee Exam   Skin Exam:   skin intact, prior incision  Effusion:   none  Alignment:   neutral  ROM:   0-115  Laxity with varus/valgus stress:   <5 degrees  Gait:   normal  Comments:   Sensation intact to light touch L1-S1  TA/EHL/GS intact  Palpable DP, W/WP  Calf soft, non tender       Past Medical History - reviewed  Rheumatoid arthritis  Hypertension  Thyroid disease  Lupus    Family History - reviewed  Arthritis (mother)    Surgical History - reviewed  left knee steel plate and screws   lung surgery   Right TKA 10/14/20 [facility]    Social History - reviewed  Hand dominance: right  Occupation: house keeping     Risk Factors - reviewed  Patient had a flu shot in the last 12 months? yes  Date of last shot: 10/20/2020 The patient is 72 years or older and has had a pneumonia vaccine: no  Patient has an implant metal  Where are the metal implants located? right knee, left knee  Tobacco status: former smoker  Alcohol use: does not drink alcohol  Does patient exercise? yes  In the past 12 months, have you fallen? no        Current Medications (including meds started today):   MELOXICAM 15 MG ORAL TABLET (MELOXICAM) ; Route: ORAL  HYDROXYCHLOROQUINE SULFATE 200 MG ORAL TABLET (HYDROXYCHLOROQUINE SULFATE) ; Route: ORAL  SYNTHROID 25 MCG ORAL TABLET (LEVOTHYROXINE SODIUM) ; Route: ORAL  HYDROCHLOROTHIAZIDE 25 MG ORAL TABLET (HYDROCHLOROTHIAZIDE) ; Route: ORAL      Current Allergies (reviewed this update):  No known allergies      Vital Signs   Weight: 263.41 lbs. (119.48 kg.)  Height: 65 in.    (165.10 cm.)  Temperature: 98.71 deg F    (37.06 deg C)  Pulse Rate: 77  Blood Pressure: 102/81 mm Hg  Body Mass Index: 43.83  Body Surface Area: 2.22 m2      Review of Systems   General:  Patient denies sweats, weight loss, fevers, chills, fatigue. Eyes:  Patient denies eye irritation, vision loss - 1 eye, discharge, blurring, vision loss - both eyes. ENT:  Patient denies decreased hearing, difficulty swallowing. Cardiovascular: Positive for swelling of hands or feet. Respiratory:  Patient denies cough, coughing up blood, chest discomfort, wheezing, shortness of breath. Gastrointestinal:  Patient denies vomiting, loss of appetite, diarrhea, nausea. Genitourinary:  Patient denies urinary retention, urinary frequency, frequent UTI, urinary urgency, pain. Musculoskeletal: Positive for arthritis. Skin:  Patient denies dryness, unusual hair distribution, suspicious lesions, psoriasis, changes in color of skin, changes in nail beds, poor wound healing. Neurologic:  Patient denies headaches, tingling, visual disturbances, weakness, seizures, memory loss, fainting, tremors, poor balance, disturbances in coordination, numbness, falling down. Psychiatric:  Patient denies anxiety, depression. Heme/Lymphatic:  Patient denies abnormal bruising. Allergic/Immunologic:  Patient denies seasonal allergies, persistent infections. The remainder of the complete review of systems was negative. Impression   1. Presence of right artificial knee joint: Improved  2. Aftercare following joint replacement: Improved  3. Presence of retained hardware: Unchanged  4. Left knee osteoarthritis: Unchanged  5. Varus deformity, not elsewhere classified, left knee: Unchanged    Plan of Care:   Ishan Ann is doing well at this time with her right knee. She is ready for part 1 of her 2 stage procedure. She is scheduled for a hardware removal on 1/13/21.

## 2021-01-13 ENCOUNTER — ANESTHESIA EVENT (OUTPATIENT)
Dept: OPERATING ROOM | Age: 51
End: 2021-01-13
Payer: COMMERCIAL

## 2021-01-13 ENCOUNTER — HOSPITAL ENCOUNTER (OUTPATIENT)
Age: 51
Setting detail: OUTPATIENT SURGERY
Discharge: HOME | End: 2021-01-13
Attending: ORTHOPAEDIC SURGERY | Admitting: ORTHOPAEDIC SURGERY
Payer: COMMERCIAL

## 2021-01-13 ENCOUNTER — APPOINTMENT (OUTPATIENT)
Dept: GENERAL RADIOLOGY | Age: 51
End: 2021-01-13
Attending: ORTHOPAEDIC SURGERY
Payer: COMMERCIAL

## 2021-01-13 ENCOUNTER — ANESTHESIA (OUTPATIENT)
Dept: OPERATING ROOM | Age: 51
End: 2021-01-13
Payer: COMMERCIAL

## 2021-01-13 VITALS — TEMPERATURE: 97.7 F | DIASTOLIC BLOOD PRESSURE: 67 MMHG | OXYGEN SATURATION: 93 % | SYSTOLIC BLOOD PRESSURE: 132 MMHG

## 2021-01-13 VITALS
TEMPERATURE: 97.4 F | HEART RATE: 85 BPM | OXYGEN SATURATION: 96 % | BODY MASS INDEX: 43.32 KG/M2 | HEIGHT: 65 IN | SYSTOLIC BLOOD PRESSURE: 119 MMHG | DIASTOLIC BLOOD PRESSURE: 68 MMHG | WEIGHT: 260 LBS | RESPIRATION RATE: 18 BRPM

## 2021-01-13 DIAGNOSIS — M17.11 PRIMARY OSTEOARTHRITIS OF RIGHT KNEE: ICD-10-CM

## 2021-01-13 DIAGNOSIS — Z01.818 PREOP TESTING: Primary | ICD-10-CM

## 2021-01-13 PROCEDURE — 2500000003 HC RX 250 WO HCPCS: Performed by: NURSE ANESTHETIST, CERTIFIED REGISTERED

## 2021-01-13 PROCEDURE — 6360000002 HC RX W HCPCS: Performed by: NURSE ANESTHETIST, CERTIFIED REGISTERED

## 2021-01-13 PROCEDURE — 3700000000 HC ANESTHESIA ATTENDED CARE: Performed by: ORTHOPAEDIC SURGERY

## 2021-01-13 PROCEDURE — 6370000000 HC RX 637 (ALT 250 FOR IP): Performed by: ANESTHESIOLOGY

## 2021-01-13 PROCEDURE — 2500000003 HC RX 250 WO HCPCS: Performed by: ORTHOPAEDIC SURGERY

## 2021-01-13 PROCEDURE — 3600000002 HC SURGERY LEVEL 2 BASE: Performed by: ORTHOPAEDIC SURGERY

## 2021-01-13 PROCEDURE — 2580000003 HC RX 258: Performed by: NURSE ANESTHETIST, CERTIFIED REGISTERED

## 2021-01-13 PROCEDURE — 7100000010 HC PHASE II RECOVERY - FIRST 15 MIN: Performed by: ORTHOPAEDIC SURGERY

## 2021-01-13 PROCEDURE — 3209999900 FLUORO FOR SURGICAL PROCEDURES

## 2021-01-13 PROCEDURE — 7100000011 HC PHASE II RECOVERY - ADDTL 15 MIN: Performed by: ORTHOPAEDIC SURGERY

## 2021-01-13 PROCEDURE — 6360000002 HC RX W HCPCS: Performed by: ORTHOPAEDIC SURGERY

## 2021-01-13 PROCEDURE — 7100000000 HC PACU RECOVERY - FIRST 15 MIN: Performed by: ORTHOPAEDIC SURGERY

## 2021-01-13 PROCEDURE — 7100000001 HC PACU RECOVERY - ADDTL 15 MIN: Performed by: ORTHOPAEDIC SURGERY

## 2021-01-13 PROCEDURE — 2709999900 HC NON-CHARGEABLE SUPPLY: Performed by: ORTHOPAEDIC SURGERY

## 2021-01-13 PROCEDURE — 88300 SURGICAL PATH GROSS: CPT

## 2021-01-13 PROCEDURE — 3600000012 HC SURGERY LEVEL 2 ADDTL 15MIN: Performed by: ORTHOPAEDIC SURGERY

## 2021-01-13 PROCEDURE — 3700000001 HC ADD 15 MINUTES (ANESTHESIA): Performed by: ORTHOPAEDIC SURGERY

## 2021-01-13 RX ORDER — ONDANSETRON 2 MG/ML
4 INJECTION INTRAMUSCULAR; INTRAVENOUS
Status: DISCONTINUED | OUTPATIENT
Start: 2021-01-13 | End: 2021-01-13 | Stop reason: HOSPADM

## 2021-01-13 RX ORDER — MIDAZOLAM HYDROCHLORIDE 1 MG/ML
INJECTION INTRAMUSCULAR; INTRAVENOUS PRN
Status: DISCONTINUED | OUTPATIENT
Start: 2021-01-13 | End: 2021-01-13 | Stop reason: SDUPTHER

## 2021-01-13 RX ORDER — LIDOCAINE HYDROCHLORIDE 20 MG/ML
INJECTION, SOLUTION EPIDURAL; INFILTRATION; INTRACAUDAL; PERINEURAL PRN
Status: DISCONTINUED | OUTPATIENT
Start: 2021-01-13 | End: 2021-01-13 | Stop reason: SDUPTHER

## 2021-01-13 RX ORDER — OXYCODONE HYDROCHLORIDE AND ACETAMINOPHEN 5; 325 MG/1; MG/1
1 TABLET ORAL EVERY 6 HOURS PRN
Qty: 28 TABLET | Refills: 0 | Status: SHIPPED | OUTPATIENT
Start: 2021-01-13 | End: 2021-01-20

## 2021-01-13 RX ORDER — ONDANSETRON 2 MG/ML
INJECTION INTRAMUSCULAR; INTRAVENOUS PRN
Status: DISCONTINUED | OUTPATIENT
Start: 2021-01-13 | End: 2021-01-13 | Stop reason: SDUPTHER

## 2021-01-13 RX ORDER — NEOSTIGMINE METHYLSULFATE 1 MG/ML
INJECTION, SOLUTION INTRAVENOUS PRN
Status: DISCONTINUED | OUTPATIENT
Start: 2021-01-13 | End: 2021-01-13 | Stop reason: SDUPTHER

## 2021-01-13 RX ORDER — FENTANYL CITRATE 50 UG/ML
INJECTION, SOLUTION INTRAMUSCULAR; INTRAVENOUS PRN
Status: DISCONTINUED | OUTPATIENT
Start: 2021-01-13 | End: 2021-01-13 | Stop reason: SDUPTHER

## 2021-01-13 RX ORDER — MEPERIDINE HYDROCHLORIDE 25 MG/ML
12.5 INJECTION INTRAMUSCULAR; INTRAVENOUS; SUBCUTANEOUS EVERY 5 MIN PRN
Status: DISCONTINUED | OUTPATIENT
Start: 2021-01-13 | End: 2021-01-13 | Stop reason: HOSPADM

## 2021-01-13 RX ORDER — GLYCOPYRROLATE 1 MG/5 ML
SYRINGE (ML) INTRAVENOUS PRN
Status: DISCONTINUED | OUTPATIENT
Start: 2021-01-13 | End: 2021-01-13 | Stop reason: SDUPTHER

## 2021-01-13 RX ORDER — SODIUM CHLORIDE 9 MG/ML
INJECTION, SOLUTION INTRAVENOUS CONTINUOUS PRN
Status: DISCONTINUED | OUTPATIENT
Start: 2021-01-13 | End: 2021-01-13 | Stop reason: SDUPTHER

## 2021-01-13 RX ORDER — OXYCODONE HYDROCHLORIDE AND ACETAMINOPHEN 5; 325 MG/1; MG/1
1 TABLET ORAL
Status: COMPLETED | OUTPATIENT
Start: 2021-01-13 | End: 2021-01-13

## 2021-01-13 RX ORDER — SODIUM CHLORIDE 0.9 % (FLUSH) 0.9 %
10 SYRINGE (ML) INJECTION PRN
Status: DISCONTINUED | OUTPATIENT
Start: 2021-01-13 | End: 2021-01-13 | Stop reason: HOSPADM

## 2021-01-13 RX ORDER — SODIUM CHLORIDE 0.9 % (FLUSH) 0.9 %
10 SYRINGE (ML) INJECTION EVERY 12 HOURS SCHEDULED
Status: DISCONTINUED | OUTPATIENT
Start: 2021-01-13 | End: 2021-01-13 | Stop reason: HOSPADM

## 2021-01-13 RX ORDER — ASPIRIN 325 MG
325 TABLET, DELAYED RELEASE (ENTERIC COATED) ORAL DAILY
Qty: 42 TABLET | Refills: 0 | Status: SHIPPED | OUTPATIENT
Start: 2021-01-13 | End: 2021-05-07

## 2021-01-13 RX ORDER — PROPOFOL 10 MG/ML
INJECTION, EMULSION INTRAVENOUS PRN
Status: DISCONTINUED | OUTPATIENT
Start: 2021-01-13 | End: 2021-01-13 | Stop reason: SDUPTHER

## 2021-01-13 RX ORDER — LABETALOL HYDROCHLORIDE 5 MG/ML
INJECTION, SOLUTION INTRAVENOUS PRN
Status: DISCONTINUED | OUTPATIENT
Start: 2021-01-13 | End: 2021-01-13 | Stop reason: SDUPTHER

## 2021-01-13 RX ORDER — BUPIVACAINE HYDROCHLORIDE 5 MG/ML
INJECTION, SOLUTION EPIDURAL; INTRACAUDAL PRN
Status: DISCONTINUED | OUTPATIENT
Start: 2021-01-13 | End: 2021-01-13 | Stop reason: ALTCHOICE

## 2021-01-13 RX ORDER — ROCURONIUM BROMIDE 10 MG/ML
INJECTION, SOLUTION INTRAVENOUS PRN
Status: DISCONTINUED | OUTPATIENT
Start: 2021-01-13 | End: 2021-01-13 | Stop reason: SDUPTHER

## 2021-01-13 RX ADMIN — FENTANYL CITRATE 100 MCG: 50 INJECTION, SOLUTION INTRAMUSCULAR; INTRAVENOUS at 07:22

## 2021-01-13 RX ADMIN — LABETALOL HYDROCHLORIDE 5 MG: 5 INJECTION INTRAVENOUS at 07:53

## 2021-01-13 RX ADMIN — PROPOFOL 200 MG: 10 INJECTION, EMULSION INTRAVENOUS at 07:22

## 2021-01-13 RX ADMIN — FENTANYL CITRATE 50 MCG: 50 INJECTION, SOLUTION INTRAMUSCULAR; INTRAVENOUS at 09:26

## 2021-01-13 RX ADMIN — ROCURONIUM BROMIDE 50 MG: 10 INJECTION, SOLUTION INTRAVENOUS at 07:22

## 2021-01-13 RX ADMIN — FENTANYL CITRATE 50 MCG: 50 INJECTION, SOLUTION INTRAMUSCULAR; INTRAVENOUS at 07:54

## 2021-01-13 RX ADMIN — Medication 2 G: at 07:10

## 2021-01-13 RX ADMIN — FENTANYL CITRATE 50 MCG: 50 INJECTION, SOLUTION INTRAMUSCULAR; INTRAVENOUS at 08:00

## 2021-01-13 RX ADMIN — LIDOCAINE HYDROCHLORIDE 100 MG: 20 INJECTION, SOLUTION EPIDURAL; INFILTRATION; INTRACAUDAL; PERINEURAL at 07:22

## 2021-01-13 RX ADMIN — FENTANYL CITRATE 50 MCG: 50 INJECTION, SOLUTION INTRAMUSCULAR; INTRAVENOUS at 07:49

## 2021-01-13 RX ADMIN — FENTANYL CITRATE 50 MCG: 50 INJECTION, SOLUTION INTRAMUSCULAR; INTRAVENOUS at 08:59

## 2021-01-13 RX ADMIN — Medication 0.6 MG: at 09:00

## 2021-01-13 RX ADMIN — LABETALOL HYDROCHLORIDE 5 MG: 5 INJECTION INTRAVENOUS at 08:00

## 2021-01-13 RX ADMIN — MIDAZOLAM 2 MG: 1 INJECTION INTRAMUSCULAR; INTRAVENOUS at 07:18

## 2021-01-13 RX ADMIN — Medication 3 MG: at 09:00

## 2021-01-13 RX ADMIN — OXYCODONE HYDROCHLORIDE AND ACETAMINOPHEN 1 TABLET: 5; 325 TABLET ORAL at 12:12

## 2021-01-13 RX ADMIN — SODIUM CHLORIDE: 9 INJECTION, SOLUTION INTRAVENOUS at 08:33

## 2021-01-13 RX ADMIN — ONDANSETRON 4 MG: 2 INJECTION INTRAMUSCULAR; INTRAVENOUS at 07:31

## 2021-01-13 RX ADMIN — SODIUM CHLORIDE: 9 INJECTION, SOLUTION INTRAVENOUS at 06:40

## 2021-01-13 ASSESSMENT — PULMONARY FUNCTION TESTS
PIF_VALUE: 39
PIF_VALUE: 38
PIF_VALUE: 37
PIF_VALUE: 38
PIF_VALUE: 37
PIF_VALUE: 33
PIF_VALUE: 39
PIF_VALUE: 38
PIF_VALUE: 32
PIF_VALUE: 38
PIF_VALUE: 33
PIF_VALUE: 32
PIF_VALUE: 29
PIF_VALUE: 28
PIF_VALUE: 3
PIF_VALUE: 37
PIF_VALUE: 38
PIF_VALUE: 3
PIF_VALUE: 37
PIF_VALUE: 29
PIF_VALUE: 0
PIF_VALUE: 33
PIF_VALUE: 33
PIF_VALUE: 3
PIF_VALUE: 37
PIF_VALUE: 3
PIF_VALUE: 31
PIF_VALUE: 0
PIF_VALUE: 0
PIF_VALUE: 2
PIF_VALUE: 37
PIF_VALUE: 38
PIF_VALUE: 31
PIF_VALUE: 38
PIF_VALUE: 33
PIF_VALUE: 38
PIF_VALUE: 38
PIF_VALUE: 37
PIF_VALUE: 38
PIF_VALUE: 28
PIF_VALUE: 38
PIF_VALUE: 5
PIF_VALUE: 38
PIF_VALUE: 39
PIF_VALUE: 39
PIF_VALUE: 30
PIF_VALUE: 32
PIF_VALUE: 3
PIF_VALUE: 32
PIF_VALUE: 38
PIF_VALUE: 37
PIF_VALUE: 38
PIF_VALUE: 29
PIF_VALUE: 2
PIF_VALUE: 16
PIF_VALUE: 3
PIF_VALUE: 3
PIF_VALUE: 38
PIF_VALUE: 39
PIF_VALUE: 38
PIF_VALUE: 37
PIF_VALUE: 1
PIF_VALUE: 3
PIF_VALUE: 1
PIF_VALUE: 2

## 2021-01-13 ASSESSMENT — PAIN DESCRIPTION - ORIENTATION: ORIENTATION: LEFT

## 2021-01-13 ASSESSMENT — PAIN - FUNCTIONAL ASSESSMENT: PAIN_FUNCTIONAL_ASSESSMENT: 0-10

## 2021-01-13 ASSESSMENT — PAIN SCALES - GENERAL
PAINLEVEL_OUTOF10: 0
PAINLEVEL_OUTOF10: 6

## 2021-01-13 ASSESSMENT — PAIN DESCRIPTION - PROGRESSION: CLINICAL_PROGRESSION: GRADUALLY WORSENING

## 2021-01-13 ASSESSMENT — LIFESTYLE VARIABLES: SMOKING_STATUS: 0

## 2021-01-13 ASSESSMENT — PAIN DESCRIPTION - PAIN TYPE: TYPE: SURGICAL PAIN

## 2021-01-13 NOTE — INTERVAL H&P NOTE
Update History & Physical    H&P reviewed. No changes.     Electronically signed by Ivory Villagran MD on 1/13/2021 at 7:10 AM

## 2021-01-13 NOTE — PROGRESS NOTES
Incentive spirometer provided to patient and teaching provided. Pt able to obtain 500-1000ml. Breathing exercises tolerated well.

## 2021-01-13 NOTE — OP NOTE
Operative Report  Lavinia James  [id number]  1/13/2021    Pre-operative diagnosis:  1. Painful hardware left knee      2. Left knee osteoarthritis    Post-operative diagnosis:  1. Painful hardware left knee      2. Left knee osteoarthritis    Procedure: Irrigation and Debridement    Surgeon: Khloe Levi MD    Assistant:  Mando Lopez    Anesthesia:  General    Tourniquet time: 90 minutes    Operative Indication:  44 y.o. female presented with severe left knee osteoarthritis and retaine hardware. We discussed a 2 stage approach with first removing the hardware and letting the wound heal and then going back for a left TKA at a later visit. The rationale behind the surgery explained and informed consent was obtained following a thorough review of risks, benefits, and alternative treatment options. The typical post-operative recovery process was also discussed. The risks for surgery include bleeding, infection, damage to blood vessels, damage to nerves, risk of further surgery, chronic pain, restricted range of motion, risk of continued discomfort, further procedures, risk of need for altered activities and altered gait, risk of blood clots, pulmonary embolism, myocardial infarction, and risk of death were discussed. The patient understood these risks and consented for surgery    EBL: <60 c    Complications: None    Operative Procedure: The patient was positioned supine on the table and general anaesthesia was achieved. The leg was then prepped and draped in the usual fashion. A timeout was performed identifying the patient, operative site and procedure being performed. The previous incision was noted and 12 cm incision was made in the midline using the previous incision using a 10 blade. It was taken down to bone. Using a key elevator and bovie, the fascia and muscle was elevated off the lateral tibia exposing the plate. Fibrinous tissue was removed with a rongeur. An osteotome was used to remove bone that had grown over the plate and screw heads. 4 screws were easily removed with a screw . An osteotome was then used to free the plate from the bone. Extra bone that had grown between the empty screw holes was removed with a rongeur. The final screw was more proximal.  The incision was then extended proximally throught the lateral edge of the patellar tendon and taken around the lateral edge of the patella. Fluoroscopy was used to identify the proximal screw. A rongeur was used to remove bone around the screw head. There screw was then removed with a screw . The knee was then thoroughly irrigated with normal saline. The Ranawat cocktail was then injected into the soft tissues prior to closure. The wound was then closed with 1, 2-0, 3-0 Stratofix, dermabond and steri strips. A sterile dressing was then applied and the knee was wrapped with an ACE wrap. The tourniquet was then dropped. There were no complications and the patient tolerated the procedure well. The patient was taken to the recovery room in stable condition. Fluoroscopy was then used to confirm removal of all hardware. The knee was then thoroughly irrigated with normal saline. The knee was then injected with marcaine into the soft tissues prior to closure. The wound was then closed with 1, 2-0, 3-0 Stratofix, dermabond and steri strips. A sterile dressing was then applied and the knee was wrapped with an ACE wrap. The tourniquet was then dropped. There were no complications and the patient tolerated the procedure well. The patient was taken to the recovery room in stable condition.

## 2021-01-13 NOTE — ANESTHESIA PRE PROCEDURE
Department of Anesthesiology  Preprocedure Note       Name:  Yodit Sanabria   Age:  44 y.o.  :  1970                                          MRN:  [id number]         Date:  2021      Surgeon: Mehran Patel):  Samantha Root MD    Procedure: Procedure(s):  LEFT PROXIMAL TIBIA HARDWARE REMOVAL   ++SYNTHES++    Medications prior to admission:   Prior to Admission medications    Medication Sig Start Date End Date Taking? Authorizing Provider   hydroxychloroquine (PLAQUENIL) 200 MG tablet Take by mouth 2 times daily    Historical Provider, MD   oxybutynin (DITROPAN-XL) 5 MG extended release tablet Take 5 mg by mouth 2 times daily    Historical Provider, MD   hydroCHLOROthiazide (HYDRODIURIL) 25 MG tablet Take 25 mg by mouth daily    Historical Provider, MD   levothyroxine (SYNTHROID) 25 MCG tablet Take 25 mcg by mouth Daily    Historical Provider, MD   meloxicam (MOBIC) 15 MG tablet Take 15 mg by mouth daily     Historical Provider, MD       Current medications:    No current facility-administered medications for this visit. No current outpatient medications on file. Facility-Administered Medications Ordered in Other Visits   Medication Dose Route Frequency Provider Last Rate Last Admin    sodium chloride flush 0.9 % injection 10 mL  10 mL Intravenous 2 times per day Samantha Root MD        sodium chloride flush 0.9 % injection 10 mL  10 mL Intravenous PRN Samantha Root MD        ceFAZolin (ANCEF) 2 g in sterile water 20 mL IV syringe  2 g Intravenous On Call to OR Samantha Root MD           Allergies:  No Known Allergies    Problem List:    Patient Active Problem List   Diagnosis Code    Primary osteoarthritis of right knee M17.11       Past Medical History:        Diagnosis Date    Hypertension     Rheumatoid arthritis ([facility] [address]     follows with DR. Jania Bazan Thyroid disease     Urinary urgency        Past Surgical History:        Procedure Laterality Date    FRACTURE SURGERY Left     knee / insertion of hardware    LUNG REMOVAL, PARTIAL      left lower lobe - as a child 1980's     TONSILLECTOMY      TOTAL KNEE ARTHROPLASTY Right 10/14/2020    TRELL ROBOTIC ASSISTED  RIGHT KNEE TOTAL ARTHROPLASTY performed [facility] Brett [facility]ner MD [facility] OR       Social History:    Social History     Tobacco Use    Smoking status: Never Smoker    Smokeless tobacco: Never Used   Substance Use Topics    Alcohol use: Never     Frequency: Never                                Counseling given: Not Answered      Vital Signs (Current): There were no vitals filed for this visit. BP Readings from Last 3 Encounters:   01/13/21 (!) 147/91   10/15/20 129/76   10/14/20 (!) 99/54       NPO Status:                                                                                 BMI:   Wt Readings from Last 3 Encounters:   01/13/21 260 lb (117.9 kg)   10/14/20 266 lb (120.7 kg)   10/06/20 266 lb (120.7 kg)     There is no height or weight on file to calculate BMI.    CBC:   Lab Results   Component Value Date    WBC 14.2 10/15/2020    RBC 4.08 10/15/2020    HGB 12.3 10/15/2020    HCT 37.7 10/15/2020    MCV 92.4 10/15/2020    RDW 13.8 10/15/2020     10/15/2020       CMP:   Lab Results   Component Value Date     10/15/2020    K 3.6 10/15/2020     10/15/2020    CO2 24 10/15/2020    BUN 16 10/15/2020    CREATININE 0.9 10/15/2020    GFRAA >60 10/15/2020    LABGLOM >60 10/15/2020    GLUCOSE 129 10/15/2020    CALCIUM 8.8 10/15/2020       POC Tests: No results for input(s): POCGLU, POCNA, POCK, POCCL, POCBUN, POCHEMO, POCHCT in the last 72 hours.     Coags: No results found for: PROTIME, INR, APTT    HCG (If Applicable): No results found for: PREGTESTUR, PREGSERUM, HCG, HCGQUANT     ABGs: No results found for: PHART, PO2ART, KCL3GPC, AUI7CME, BEART, O5SRXGJD     Type & Screen (If Applicable):  No results found for: LABABO, LABRH    Drug/Infectious Status (If Applicable):  No results

## 2021-01-13 NOTE — ANESTHESIA POSTPROCEDURE EVALUATION
Department of Anesthesiology  Postprocedure Note    Patient: Salley Kocher  MRN: [id number]  YOB: 1970  Date of evaluation: 1/13/2021  Time:  10:24 AM     Procedure Summary     Date: 01/13/21 Room / Location: [facility] OR 02 / [facility]    Anesthesia Start: [facility] Anesthesia Stop: 9963    Procedure: LEFT PROXIMAL TIBIA HARDWARE REMOVAL   ++SYNTHES++ (Left ) Diagnosis: (RETAINED HARDWARE)    Surgeons: Calixto Escobar MD Responsible Provider: Carlito Mccartney MD    Anesthesia Type: general ASA Status: 3          Anesthesia Type: general    Jeri Phase I: Jeri Score: 8    Jeri Phase II:      Last vitals: Reviewed and per EMR flowsheets.        Anesthesia Post Evaluation    Patient location during evaluation: PACU  Patient participation: complete - patient participated  Level of consciousness: awake and alert  Airway patency: patent  Nausea & Vomiting: no vomiting and no nausea  Complications: no  Cardiovascular status: hemodynamically stable  Respiratory status: acceptable  Hydration status: stable

## 2021-04-29 NOTE — PROGRESS NOTES
Chart checked by orthopedic navigator for bmi,diabetes, and smoking to optimize pt prior to surgery.

## 2021-05-07 ENCOUNTER — HOSPITAL ENCOUNTER (OUTPATIENT)
Dept: CT IMAGING | Age: 51
Discharge: HOME | End: 2021-05-09
Payer: COMMERCIAL

## 2021-05-07 ENCOUNTER — HOSPITAL ENCOUNTER (OUTPATIENT)
Dept: PREADMISSION TESTING | Age: 51
Discharge: HOME | End: 2021-05-07
Payer: COMMERCIAL

## 2021-05-07 ENCOUNTER — HOSPITAL ENCOUNTER (OUTPATIENT)
Age: 51
Discharge: HOME | End: 2021-05-09
Payer: COMMERCIAL

## 2021-05-07 ENCOUNTER — ANESTHESIA EVENT (OUTPATIENT)
Dept: OPERATING ROOM | Age: 51
End: 2021-05-07
Payer: COMMERCIAL

## 2021-05-07 VITALS
BODY MASS INDEX: 44.36 KG/M2 | SYSTOLIC BLOOD PRESSURE: 154 MMHG | TEMPERATURE: 97.8 F | HEART RATE: 72 BPM | OXYGEN SATURATION: 95 % | DIASTOLIC BLOOD PRESSURE: 78 MMHG | WEIGHT: 276 LBS | RESPIRATION RATE: 18 BRPM | HEIGHT: 66 IN

## 2021-05-07 DIAGNOSIS — M17.12 OSTEOARTHRITIS OF LEFT KNEE, UNSPECIFIED OSTEOARTHRITIS TYPE: ICD-10-CM

## 2021-05-07 DIAGNOSIS — Z01.818 PREOP TESTING: ICD-10-CM

## 2021-05-07 LAB
ANION GAP SERPL CALCULATED.3IONS-SCNC: 7 MMOL/L (ref 7–16)
BUN BLDV-MCNC: 17 MG/DL (ref 6–20)
CALCIUM SERPL-MCNC: 9.1 MG/DL (ref 8.6–10.2)
CHLORIDE BLD-SCNC: 103 MMOL/L (ref 98–107)
CO2: 28 MMOL/L (ref 22–29)
CREAT SERPL-MCNC: 0.8 MG/DL (ref 0.5–1)
GFR AFRICAN AMERICAN: >60
GFR NON-AFRICAN AMERICAN: >60 ML/MIN/1.73
GLUCOSE BLD-MCNC: 94 MG/DL (ref 74–99)
HCT VFR BLD CALC: 46 % (ref 34–48)
HEMOGLOBIN: 14.5 G/DL (ref 11.5–15.5)
MCH RBC QN AUTO: 28.8 PG (ref 26–35)
MCHC RBC AUTO-ENTMCNC: 31.5 % (ref 32–34.5)
MCV RBC AUTO: 91.5 FL (ref 80–99.9)
PDW BLD-RTO: 14.7 FL (ref 11.5–15)
PLATELET # BLD: 228 E9/L (ref 130–450)
PMV BLD AUTO: 10.3 FL (ref 7–12)
POTASSIUM SERPL-SCNC: 3.7 MMOL/L (ref 3.5–5)
RBC # BLD: 5.03 E12/L (ref 3.5–5.5)
SODIUM BLD-SCNC: 138 MMOL/L (ref 132–146)
WBC # BLD: 7.7 E9/L (ref 4.5–11.5)

## 2021-05-07 PROCEDURE — 73700 CT LOWER EXTREMITY W/O DYE: CPT

## 2021-05-07 PROCEDURE — 80048 BASIC METABOLIC PNL TOTAL CA: CPT

## 2021-05-07 PROCEDURE — 85027 COMPLETE CBC AUTOMATED: CPT

## 2021-05-07 PROCEDURE — 36415 COLL VENOUS BLD VENIPUNCTURE: CPT

## 2021-05-07 PROCEDURE — 87081 CULTURE SCREEN ONLY: CPT

## 2021-05-07 RX ORDER — MIDAZOLAM HYDROCHLORIDE 2 MG/2ML
1 INJECTION, SOLUTION INTRAMUSCULAR; INTRAVENOUS PRN
Status: CANCELLED | OUTPATIENT
Start: 2021-05-12

## 2021-05-07 RX ORDER — ROPIVACAINE HYDROCHLORIDE 5 MG/ML
30 INJECTION, SOLUTION EPIDURAL; INFILTRATION; PERINEURAL ONCE
Status: CANCELLED | OUTPATIENT
Start: 2021-05-12

## 2021-05-07 ASSESSMENT — KOOS JR
RISING FROM SITTING: 1
STANDING UPRIGHT: 1
TWISING OR PIVOTING ON KNEE: 1

## 2021-05-07 ASSESSMENT — LIFESTYLE VARIABLES: SMOKING_STATUS: 0

## 2021-05-07 NOTE — PROGRESS NOTES
Have you been tested for COVID  Yes           Have you been told you were positive for COVID No  Have you had any known exposure to someone that is positive for COVID No  Do you have a cough                   No              Do you have shortness of breath No                 Do you have a sore throat            No                Are you having chills                    No                Are you having muscle aches. No                    Please come to the hospital wearing a mask and have your significant other wear a mask as well. Both of you should check your temperature before leaving to come here,  if it is 100 or higher please call [phone] for instruction. Alexei PRE-ADMISSION TESTING INSTRUCTIONS    The Preadmission Testing patient is instructed accordingly using the following criteria (check applicable):    ARRIVAL INSTRUCTIONS:  [x] Parking the day of Surgery is located in the Main Entrance lot. Upon entering the door, make an immediate right to the surgery reception desk    [x] Bring photo ID and insurance card    [] Bring in a copy of Living will or Durable Power of  papers.     [] Please be sure to arrange for responsible adult to provide transportation to and from the hospital    [] Please arrange for responsible adult to be with you for the 24 hour period post procedure due to having anesthesia      GENERAL INSTRUCTIONS:    [x] Nothing by mouth after midnight, including gum, candy, mints or water    [x] You may brush your teeth, but do not swallow any water    [x] Take medications as instructed with 1-2 oz of water    [] Stop herbal supplements and vitamins 5 days prior to procedure    [] Follow preop dosing of blood thinners per physician instructions    [] Take 1/2 dose of evening insulin, but no insulin after midnight    [] No oral diabetic medications after midnight    [] If diabetic and have low blood sugar or feel symptomatic, take 1-2oz apple juice only    [] Bring inhalers day of surgery    [] Bring C-PAP/ Bi-Pap day of surgery    [] Bring urine specimen day of surgery    [] Shower or bath with soap, lather and rinse well, AM of Surgery, no lotion, powders or creams to surgical site    [] Follow bowel prep as instructed per surgeon    [x] No tobacco products within 24 hours of surgery     [x] No alcohol or illegal drug use within 24 hours of surgery.     [x] Jewelry, body piercing's, eyeglasses, contact lenses and dentures are not permitted into surgery (bring cases)      [x] Please do not wear any nail polish, make up or hair products on the day of surgery    [x] You can expect a call the business day prior to procedure to notify you if your arrival time changes    [x] If you receive a survey after surgery we would greatly appreciate your comments    [] Parent/guardian of a minor must accompany their child and remain on the premises  the entire time they are under our care     [] Pediatric patients may bring favorite toy, blanket or comfort item with them    [] A caregiver or family member must remain with the patient during their stay if they are mentally handicapped, have dementia, disoriented or unable to use a call light or would be a safety concern if left unattended    [x] Please notify surgeon if you develop any illness between now and time of surgery (cold, cough, sore throat, fever, nausea, vomiting) or any signs of infections  including skin, wounds, and dental.    [x]  The Outpatient Pharmacy is available to fill your prescription here on your day of surgery, ask your preop nurse for details    [] Other instructions    EDUCATIONAL MATERIALS PROVIDED:    [x] PAT Preoperative Education Packet/Booklet     [x] Medication List    [] Transfusion bracelet applied with instructions    [x] Shower with soap, lather and rinse well, and use CHG wipes provided the evening before surgery as instructed    [x] Incentive spirometer with instructions

## 2021-05-07 NOTE — ANESTHESIA PRE PROCEDURE
Department of Anesthesiology  Preprocedure Note       Name:  Joseph Vaughan   Age:  46 y.o.  :  1970                                          MRN:  [id number]         Date:  2021      Surgeon: Jeff Taylor):  Cm Becker MD    Procedure: Procedure(s):  TRELL ROBOTIC ASSISTED LEFT TOTAL KNEE ARTHROPLASTY   +++EVI+++    Medications prior to admission:   Prior to Admission medications    Medication Sig Start Date End Date Taking? Authorizing Provider   hydroxychloroquine (PLAQUENIL) 200 MG tablet Take by mouth 2 times daily    Historical Provider, MD   oxybutynin (DITROPAN-XL) 5 MG extended release tablet Take 5 mg by mouth 2 times daily    Historical Provider, MD   hydroCHLOROthiazide (HYDRODIURIL) 25 MG tablet Take 25 mg by mouth daily    Historical Provider, MD   levothyroxine (SYNTHROID) 25 MCG tablet Take 25 mcg by mouth Daily    Historical Provider, MD   meloxicam (MOBIC) 15 MG tablet Take 15 mg by mouth daily     Historical Provider, MD       Current medications:    Current Outpatient Medications   Medication Sig Dispense Refill    hydroxychloroquine (PLAQUENIL) 200 MG tablet Take by mouth 2 times daily      oxybutynin (DITROPAN-XL) 5 MG extended release tablet Take 5 mg by mouth 2 times daily      hydroCHLOROthiazide (HYDRODIURIL) 25 MG tablet Take 25 mg by mouth daily      levothyroxine (SYNTHROID) 25 MCG tablet Take 25 mcg by mouth Daily      meloxicam (MOBIC) 15 MG tablet Take 15 mg by mouth daily        No current facility-administered medications for this visit. Allergies:  No Known Allergies    Problem List:    Patient Active Problem List   Diagnosis Code    Primary osteoarthritis of right knee M17.11       Past Medical History:        Diagnosis Date    Hypertension     Rheumatoid arthritis ([facility] [address]     follows with DR. Nayana Bowers Thyroid disease     Urinary urgency        Past Surgical History:        Procedure Laterality Date    FRACTURE SURGERY Left     knee / insertion of hardware    LEG SURGERY Left 1/13/2021    LEFT PROXIMAL TIBIA HARDWARE REMOVAL performed by Kiki Walls MD at [address] [place] [address], PARTIAL      left lower lobe - as a child 16's     TONSILLECTOMY      TOTAL KNEE ARTHROPLASTY Right 10/14/2020    TRELL ROBOTIC ASSISTED  RIGHT KNEE TOTAL ARTHROPLASTY performed by Kiki Walls MD at [facility] OR       Social History:    Social History     Tobacco Use    Smoking status: Never Smoker    Smokeless tobacco: Never Used   Substance Use Topics    Alcohol use: Never     Frequency: Never                                Counseling given: Not Answered      Vital Signs (Current): There were no vitals filed for this visit. BP Readings from Last 3 Encounters:   05/07/21 (!) 154/78   01/13/21 119/68   01/13/21 132/67       NPO Status:                                                                                 BMI:   Wt Readings from Last 3 Encounters:   05/07/21 276 lb (125.2 kg)   01/13/21 260 lb (117.9 kg)   10/14/20 266 lb (120.7 kg)     There is no height or weight on file to calculate BMI.    CBC:   Lab Results   Component Value Date    WBC 14.2 10/15/2020    RBC 4.08 10/15/2020    HGB 12.3 10/15/2020    HCT 37.7 10/15/2020    MCV 92.4 10/15/2020    RDW 13.8 10/15/2020     10/15/2020       CMP:   Lab Results   Component Value Date     10/15/2020    K 3.6 10/15/2020     10/15/2020    CO2 24 10/15/2020    BUN 16 10/15/2020    CREATININE 0.9 10/15/2020    GFRAA >60 10/15/2020    LABGLOM >60 10/15/2020    GLUCOSE 129 10/15/2020    CALCIUM 8.8 10/15/2020       POC Tests: No results for input(s): POCGLU, POCNA, POCK, POCCL, POCBUN, POCHEMO, POCHCT in the last 72 hours.     Coags: No results found for: PROTIME, INR, APTT    HCG (If Applicable): No results found for: PREGTESTUR, PREGSERUM, HCG, HCGQUANT     ABGs: No results found for: PHART, PO2ART, TNP2URK, URE4EBX, BEART, W3DRSMUY     Type & Screen (If Applicable): No results found for: LABABO, LABRH    Drug/Infectious Status (If Applicable):  No results found for: HIV, HEPCAB    COVID-19 Screening (If Applicable):   Lab Results   Component Value Date    COVID19 Not Detected 01/08/2021         Anesthesia Evaluation  Patient summary reviewed and Nursing notes reviewed no history of anesthetic complications:   Airway: Mallampati: III  TM distance: >3 FB   Neck ROM: full  Mouth opening: > = 3 FB Dental:      Comment: Poor dentition--decayed, chipped. Pulmonary:Negative Pulmonary ROS breath sounds clear to auscultation      (-) not a current smoker                           Cardiovascular:  Exercise tolerance: good (>4 METS),   (+) hypertension:,     (-)  angina    ECG reviewed  Rhythm: regular  Rate: normal           Beta Blocker:  Not on Beta Blocker      ROS comment: Medically cleared. Neuro/Psych:   Negative Neuro/Psych ROS              GI/Hepatic/Renal: Neg GI/Hepatic/Renal ROS            Endo/Other:    (+) hypothyroidism: arthritis:., .                  ROS comment: ?Hx of LUPUS Abdominal:   (+) obese,         Vascular: negative vascular ROS. Anesthesia Plan      general, spinal and other     ASA 3     (Pt agrees to adductor canal block. She agrees to spinal anesthesia and IV sedation. She agrees to [facility] as a back up plan if necessary. Risks and benefits were discussed.)  Induction: intravenous. Anesthetic plan and risks discussed with patient and mother. Plan discussed with CRNA. Ruchi Grimes MD   5/7/2021    PNB Consent  Benefits, alternatives and risks of PNBs were discussed with patient. Risks include but are not limited to; bleeding, LAST, infection and possible nerve trauma. All questions answered. PNB was recommended and encouraged as part of multi-modal pain therapy. After consideration;    patient agrees to:   left        Adductor Canal Block. for management of post-op pain. Barbara Huang MD  Staff Anesthesiologist  May 12, 2021  7:53 AM

## 2021-05-08 LAB
SARS-COV-2: NOT DETECTED
SOURCE: NORMAL

## 2021-05-09 LAB — MRSA CULTURE ONLY: NORMAL

## 2021-05-09 NOTE — H&P
History and Physical  K. Nabil Milligan MD        Chief Complaint   Mariana Betancur returns for follow up of left knee. Date of last x-ray was 01/29/2021. Her current pain level is a 0/10. She is not taking medication for pain . Incision and skin is healed. Pain: anterior knee   Symptoms: locking, night pain   Previous Treatment: NSAIDS, physical therapy, home exercise program   Additional Comments: Mariana Betancur is 12 weeks out from left proximal tibial hardware removal. She is doing well, her incision has healed and she is ready to undergo elective TKA at this time. She has had a right TKA performed and she understands surgical procedure and recovery. Her pain is currently a 9/10. She has pain, grinding and difficulty performing activities due to pain.    Physical Exam   General Appearance:   Awake, alert, oriented x3   Well developed, well nourished   No acute distress   Eyes appear Normal   Respiratory:   Respiratory effort: non-labored   Cardiovascular:   Edema: absent   Varicosities: absent   Lymphatic/Skin:   Skin Appearance: Normal   Left Knee Exam   Skin Exam: prior incision   Effusion: trace   Alignment: varus   ROM: 0-115   Tenderness: anterior knee, medial joint line   Lachman: normal   Laxity with varus/valgus stress: <5 degrees   Gait: antalgic   Comments: Sensation intact to light touch L1-S1   TA/EHL/GS intact   Palpable DP, W/WP   Calf soft, non tender   Past Medical History - reviewed   Rheumatoid arthritis   Hypertension   Thyroid disease   Lupus   Family History - reviewed   Arthritis (mother)   Surgical History - reviewed   left knee steel plate and screws   lung surgery   Right TKA 10/14/20 [facility]   Left proximal tibia [facility] 1/13/21 [facility]   Social History - reviewed   Hand dominance: right   Occupation: house keeping   Risk Factors - reviewed   Patient had a flu shot in the last 12 months? yes   The patient is 72 years or older and has had a pneumonia vaccine: no   Patient has an implant metal   Where are the metal implants located? right knee, left knee   Tobacco status: former smoker   Alcohol use: does not drink alcohol   Does patient exercise? yes   In the past 12 months, have you fallen? no   Current Medications (including meds started today):   CEPHALEXIN 500 MG ORAL CAPSULE (CEPHALEXIN) 1 tablet PO Q 6 hours x 7 days; Route: ORAL   CLINDAMYCIN  MG ORAL CAPSULE (CLINDAMYCIN HCL) Take 1 table PO Q6 hours x 7 days; Route: ORAL   MELOXICAM 15 MG ORAL TABLET (MELOXICAM) ; Route: ORAL   HYDROXYCHLOROQUINE SULFATE 200 MG ORAL TABLET (HYDROXYCHLOROQUINE SULFATE) ; Route: ORAL   SYNTHROID 25 MCG ORAL TABLET (LEVOTHYROXINE SODIUM) ; Route: ORAL   HYDROCHLOROTHIAZIDE 25 MG ORAL TABLET (HYDROCHLOROTHIAZIDE) ; Route: ORAL   Current Allergies (reviewed this update):   No known allergies   Vital Signs   Weight: 263.41 lbs. (119.48 kg.)   Height: 65 in. (165.10 cm.)   Temperature: 98.60 deg F (37.00 deg C)   Pulse Rate: 100   Blood Pressure: 109/71 mm Hg   Body Mass Index: 43.83   Body Surface Area: 2.22 m2   Review of Systems   General: Patient denies sweats, weight loss, fevers, chills, fatigue. Eyes: Patient denies eye irritation, vision loss - 1 eye, discharge, blurring, vision loss - both eyes. ENT: Patient denies decreased hearing, difficulty swallowing. Cardiovascular: Positive for swelling of hands or feet. Respiratory: Patient denies cough, coughing up blood, chest discomfort, wheezing, shortness of breath. Gastrointestinal: Patient denies vomiting, loss of appetite, diarrhea, nausea. Genitourinary: Patient denies urinary retention, urinary frequency, frequent UTI, urinary urgency, pain. Musculoskeletal: Positive for arthritis. Skin: Patient denies dryness, unusual hair distribution, suspicious lesions, psoriasis, changes in color of skin, changes in nail beds, poor wound healing.    Neurologic: Patient denies headaches, tingling, visual disturbances, weakness, seizures, memory loss, fainting, tremors, poor balance, disturbances in coordination, numbness, falling down. Psychiatric: Patient denies anxiety, depression. Heme/Lymphatic: Patient denies abnormal bruising. Allergic/Immunologic: Patient denies seasonal allergies, persistent infections. The remainder of the complete review of systems was negative. Impression   1. Varus deformity, not elsewhere classified, left knee: Unchanged   2. Left knee osteoarthritis: Unchanged   3. Knee pain, left: Deteriorated   Plan of Care:   Patient is 12 weeks out from left knee hardware removal. She has post traumatic knee OA which is now causing pain and making it dificult for her to perform activities she enjoys. She would like to schedule elective total knee arthroplasty at this time. She will be scheduled for left total knee arthroplasty with [employer] robotic technology to be performed at [facility] as an inpatient procedure. Details were reviewed with her today to include post operative recovery period. She will follow up post operativley.

## 2021-05-12 ENCOUNTER — APPOINTMENT (OUTPATIENT)
Dept: GENERAL RADIOLOGY | Age: 51
End: 2021-05-12
Attending: ORTHOPAEDIC SURGERY
Payer: COMMERCIAL

## 2021-05-12 ENCOUNTER — HOSPITAL ENCOUNTER (OUTPATIENT)
Age: 51
Setting detail: OBSERVATION
Discharge: HOME | End: 2021-05-13
Attending: ORTHOPAEDIC SURGERY | Admitting: ORTHOPAEDIC SURGERY
Payer: COMMERCIAL

## 2021-05-12 ENCOUNTER — ANESTHESIA (OUTPATIENT)
Dept: OPERATING ROOM | Age: 51
End: 2021-05-12
Payer: COMMERCIAL

## 2021-05-12 VITALS — OXYGEN SATURATION: 99 % | TEMPERATURE: 91.4 F | DIASTOLIC BLOOD PRESSURE: 61 MMHG | SYSTOLIC BLOOD PRESSURE: 134 MMHG

## 2021-05-12 DIAGNOSIS — M17.32 POST-TRAUMATIC OSTEOARTHRITIS OF LEFT KNEE: ICD-10-CM

## 2021-05-12 DIAGNOSIS — G89.18 POST-OP PAIN: ICD-10-CM

## 2021-05-12 DIAGNOSIS — Z01.818 PREOP TESTING: Primary | ICD-10-CM

## 2021-05-12 PROCEDURE — 64447 NJX AA&/STRD FEMORAL NRV IMG: CPT | Performed by: ANESTHESIOLOGY

## 2021-05-12 PROCEDURE — 2580000003 HC RX 258: Performed by: ANESTHESIOLOGY

## 2021-05-12 PROCEDURE — 88311 DECALCIFY TISSUE: CPT

## 2021-05-12 PROCEDURE — 97535 SELF CARE MNGMENT TRAINING: CPT

## 2021-05-12 PROCEDURE — G0378 HOSPITAL OBSERVATION PER HR: HCPCS

## 2021-05-12 PROCEDURE — 2500000003 HC RX 250 WO HCPCS: Performed by: ORTHOPAEDIC SURGERY

## 2021-05-12 PROCEDURE — 3600000015 HC SURGERY LEVEL 5 ADDTL 15MIN: Performed by: ORTHOPAEDIC SURGERY

## 2021-05-12 PROCEDURE — 7100000001 HC PACU RECOVERY - ADDTL 15 MIN: Performed by: ORTHOPAEDIC SURGERY

## 2021-05-12 PROCEDURE — C1776 JOINT DEVICE (IMPLANTABLE): HCPCS | Performed by: ORTHOPAEDIC SURGERY

## 2021-05-12 PROCEDURE — 6370000000 HC RX 637 (ALT 250 FOR IP): Performed by: ORTHOPAEDIC SURGERY

## 2021-05-12 PROCEDURE — 6360000002 HC RX W HCPCS: Performed by: ANESTHESIOLOGY

## 2021-05-12 PROCEDURE — 97530 THERAPEUTIC ACTIVITIES: CPT

## 2021-05-12 PROCEDURE — 3600000005 HC SURGERY LEVEL 5 BASE: Performed by: ORTHOPAEDIC SURGERY

## 2021-05-12 PROCEDURE — 3700000001 HC ADD 15 MINUTES (ANESTHESIA): Performed by: ORTHOPAEDIC SURGERY

## 2021-05-12 PROCEDURE — 3700000000 HC ANESTHESIA ATTENDED CARE: Performed by: ORTHOPAEDIC SURGERY

## 2021-05-12 PROCEDURE — 2709999900 HC NON-CHARGEABLE SUPPLY: Performed by: ORTHOPAEDIC SURGERY

## 2021-05-12 PROCEDURE — 97161 PT EVAL LOW COMPLEX 20 MIN: CPT

## 2021-05-12 PROCEDURE — 2580000003 HC RX 258: Performed by: ORTHOPAEDIC SURGERY

## 2021-05-12 PROCEDURE — 2500000003 HC RX 250 WO HCPCS: Performed by: ANESTHESIOLOGY

## 2021-05-12 PROCEDURE — 97165 OT EVAL LOW COMPLEX 30 MIN: CPT

## 2021-05-12 PROCEDURE — 6360000002 HC RX W HCPCS: Performed by: ORTHOPAEDIC SURGERY

## 2021-05-12 PROCEDURE — 2720000010 HC SURG SUPPLY STERILE: Performed by: ORTHOPAEDIC SURGERY

## 2021-05-12 PROCEDURE — 73560 X-RAY EXAM OF KNEE 1 OR 2: CPT

## 2021-05-12 PROCEDURE — 7100000000 HC PACU RECOVERY - FIRST 15 MIN: Performed by: ORTHOPAEDIC SURGERY

## 2021-05-12 PROCEDURE — C1713 ANCHOR/SCREW BN/BN,TIS/BN: HCPCS | Performed by: ORTHOPAEDIC SURGERY

## 2021-05-12 PROCEDURE — C2617 STENT, NON-COR, TEM W/O DEL: HCPCS | Performed by: ORTHOPAEDIC SURGERY

## 2021-05-12 PROCEDURE — 88305 TISSUE EXAM BY PATHOLOGIST: CPT

## 2021-05-12 DEVICE — IMPLANTABLE DEVICE: Type: IMPLANTABLE DEVICE | Site: KNEE | Status: FUNCTIONAL

## 2021-05-12 DEVICE — Z DUP USE 2373672 TRITANIUM SYMMETRIC METAL BACKED PATELLA S33X9: Type: IMPLANTABLE DEVICE | Site: PATELLA | Status: FUNCTIONAL

## 2021-05-12 DEVICE — BASEPLATE TIB SZ 4 AP46MM ML70MM KNEE TRITANIUM 4 CRUCFRM: Type: IMPLANTABLE DEVICE | Site: KNEE | Status: FUNCTIONAL

## 2021-05-12 RX ORDER — MEPERIDINE HYDROCHLORIDE 25 MG/ML
12.5 INJECTION INTRAMUSCULAR; INTRAVENOUS; SUBCUTANEOUS EVERY 10 MIN PRN
Status: DISCONTINUED | OUTPATIENT
Start: 2021-05-12 | End: 2021-05-12 | Stop reason: HOSPADM

## 2021-05-12 RX ORDER — ACETAMINOPHEN 500 MG
1000 TABLET ORAL ONCE
Status: COMPLETED | OUTPATIENT
Start: 2021-05-12 | End: 2021-05-12

## 2021-05-12 RX ORDER — SODIUM CHLORIDE 0.9 % (FLUSH) 0.9 %
5-40 SYRINGE (ML) INJECTION PRN
Status: DISCONTINUED | OUTPATIENT
Start: 2021-05-12 | End: 2021-05-12 | Stop reason: HOSPADM

## 2021-05-12 RX ORDER — MIDAZOLAM HYDROCHLORIDE 1 MG/ML
INJECTION INTRAMUSCULAR; INTRAVENOUS
Status: DISPENSED
Start: 2021-05-12 | End: 2021-05-12

## 2021-05-12 RX ORDER — KETOROLAC TROMETHAMINE 30 MG/ML
30 INJECTION, SOLUTION INTRAMUSCULAR; INTRAVENOUS EVERY 6 HOURS
Status: DISCONTINUED | OUTPATIENT
Start: 2021-05-12 | End: 2021-05-13 | Stop reason: HOSPADM

## 2021-05-12 RX ORDER — GABAPENTIN 300 MG/1
600 CAPSULE ORAL ONCE
Status: COMPLETED | OUTPATIENT
Start: 2021-05-12 | End: 2021-05-12

## 2021-05-12 RX ORDER — DIPHENHYDRAMINE HYDROCHLORIDE 50 MG/ML
12.5 INJECTION INTRAMUSCULAR; INTRAVENOUS
Status: DISCONTINUED | OUTPATIENT
Start: 2021-05-12 | End: 2021-05-12 | Stop reason: HOSPADM

## 2021-05-12 RX ORDER — ONDANSETRON 2 MG/ML
INJECTION INTRAMUSCULAR; INTRAVENOUS PRN
Status: DISCONTINUED | OUTPATIENT
Start: 2021-05-12 | End: 2021-05-12 | Stop reason: SDUPTHER

## 2021-05-12 RX ORDER — PROPOFOL 10 MG/ML
INJECTION, EMULSION INTRAVENOUS CONTINUOUS PRN
Status: DISCONTINUED | OUTPATIENT
Start: 2021-05-12 | End: 2021-05-12 | Stop reason: SDUPTHER

## 2021-05-12 RX ORDER — SODIUM CHLORIDE 0.9 % (FLUSH) 0.9 %
10 SYRINGE (ML) INJECTION PRN
Status: DISCONTINUED | OUTPATIENT
Start: 2021-05-12 | End: 2021-05-13 | Stop reason: HOSPADM

## 2021-05-12 RX ORDER — ROPIVACAINE HYDROCHLORIDE 5 MG/ML
INJECTION, SOLUTION EPIDURAL; INFILTRATION; PERINEURAL
Status: COMPLETED | OUTPATIENT
Start: 2021-05-12 | End: 2021-05-12

## 2021-05-12 RX ORDER — SODIUM CHLORIDE 9 MG/ML
INJECTION, SOLUTION INTRAVENOUS CONTINUOUS
Status: DISCONTINUED | OUTPATIENT
Start: 2021-05-12 | End: 2021-05-12

## 2021-05-12 RX ORDER — SODIUM CHLORIDE 0.9 % (FLUSH) 0.9 %
10 SYRINGE (ML) INJECTION EVERY 12 HOURS SCHEDULED
Status: DISCONTINUED | OUTPATIENT
Start: 2021-05-12 | End: 2021-05-13 | Stop reason: HOSPADM

## 2021-05-12 RX ORDER — OXYCODONE HYDROCHLORIDE 5 MG/1
10 TABLET ORAL EVERY 4 HOURS PRN
Status: DISCONTINUED | OUTPATIENT
Start: 2021-05-12 | End: 2021-05-13 | Stop reason: HOSPADM

## 2021-05-12 RX ORDER — MIDAZOLAM HYDROCHLORIDE 2 MG/2ML
1 INJECTION, SOLUTION INTRAMUSCULAR; INTRAVENOUS PRN
Status: DISCONTINUED | OUTPATIENT
Start: 2021-05-12 | End: 2021-05-12 | Stop reason: HOSPADM

## 2021-05-12 RX ORDER — DEXAMETHASONE SODIUM PHOSPHATE 4 MG/ML
INJECTION, SOLUTION INTRA-ARTICULAR; INTRALESIONAL; INTRAMUSCULAR; INTRAVENOUS; SOFT TISSUE PRN
Status: DISCONTINUED | OUTPATIENT
Start: 2021-05-12 | End: 2021-05-12

## 2021-05-12 RX ORDER — SODIUM CHLORIDE 9 MG/ML
INJECTION, SOLUTION INTRAVENOUS CONTINUOUS
Status: DISCONTINUED | OUTPATIENT
Start: 2021-05-12 | End: 2021-05-13 | Stop reason: HOSPADM

## 2021-05-12 RX ORDER — ROPIVACAINE HYDROCHLORIDE 5 MG/ML
INJECTION, SOLUTION EPIDURAL; INFILTRATION; PERINEURAL
Status: DISPENSED
Start: 2021-05-12 | End: 2021-05-12

## 2021-05-12 RX ORDER — ACETAMINOPHEN 500 MG
TABLET ORAL
Status: DISPENSED
Start: 2021-05-12 | End: 2021-05-12

## 2021-05-12 RX ORDER — LIDOCAINE HYDROCHLORIDE 10 MG/ML
INJECTION, SOLUTION INFILTRATION; PERINEURAL
Status: DISPENSED
Start: 2021-05-12 | End: 2021-05-12

## 2021-05-12 RX ORDER — BUPIVACAINE HYDROCHLORIDE 7.5 MG/ML
INJECTION, SOLUTION INTRASPINAL PRN
Status: DISCONTINUED | OUTPATIENT
Start: 2021-05-12 | End: 2021-05-12 | Stop reason: SDUPTHER

## 2021-05-12 RX ORDER — PROMETHAZINE HYDROCHLORIDE 25 MG/ML
6.25 INJECTION, SOLUTION INTRAMUSCULAR; INTRAVENOUS PRN
Status: DISCONTINUED | OUTPATIENT
Start: 2021-05-12 | End: 2021-05-12 | Stop reason: HOSPADM

## 2021-05-12 RX ORDER — FENTANYL CITRATE 50 UG/ML
INJECTION, SOLUTION INTRAMUSCULAR; INTRAVENOUS PRN
Status: DISCONTINUED | OUTPATIENT
Start: 2021-05-12 | End: 2021-05-12 | Stop reason: SDUPTHER

## 2021-05-12 RX ORDER — HYDROCHLOROTHIAZIDE 25 MG/1
25 TABLET ORAL DAILY
Status: DISCONTINUED | OUTPATIENT
Start: 2021-05-12 | End: 2021-05-13 | Stop reason: HOSPADM

## 2021-05-12 RX ORDER — SENNA AND DOCUSATE SODIUM 50; 8.6 MG/1; MG/1
1 TABLET, FILM COATED ORAL 2 TIMES DAILY
Status: DISCONTINUED | OUTPATIENT
Start: 2021-05-12 | End: 2021-05-13 | Stop reason: HOSPADM

## 2021-05-12 RX ORDER — FENTANYL CITRATE 50 UG/ML
INJECTION, SOLUTION INTRAMUSCULAR; INTRAVENOUS
Status: DISPENSED
Start: 2021-05-12 | End: 2021-05-12

## 2021-05-12 RX ORDER — HYDROXYCHLOROQUINE SULFATE 200 MG/1
200 TABLET, FILM COATED ORAL 2 TIMES DAILY
Status: DISCONTINUED | OUTPATIENT
Start: 2021-05-12 | End: 2021-05-13 | Stop reason: HOSPADM

## 2021-05-12 RX ORDER — CELECOXIB 100 MG/1
CAPSULE ORAL
Status: DISPENSED
Start: 2021-05-12 | End: 2021-05-12

## 2021-05-12 RX ORDER — VANCOMYCIN HYDROCHLORIDE 1 G/20ML
INJECTION, POWDER, LYOPHILIZED, FOR SOLUTION INTRAVENOUS PRN
Status: DISCONTINUED | OUTPATIENT
Start: 2021-05-12 | End: 2021-05-12 | Stop reason: HOSPADM

## 2021-05-12 RX ORDER — SODIUM CHLORIDE 0.9 % (FLUSH) 0.9 %
5-40 SYRINGE (ML) INJECTION EVERY 12 HOURS SCHEDULED
Status: DISCONTINUED | OUTPATIENT
Start: 2021-05-12 | End: 2021-05-12 | Stop reason: HOSPADM

## 2021-05-12 RX ORDER — SODIUM CHLORIDE 9 MG/ML
25 INJECTION, SOLUTION INTRAVENOUS PRN
Status: DISCONTINUED | OUTPATIENT
Start: 2021-05-12 | End: 2021-05-12 | Stop reason: HOSPADM

## 2021-05-12 RX ORDER — DEXAMETHASONE SODIUM PHOSPHATE 10 MG/ML
10 INJECTION INTRAMUSCULAR; INTRAVENOUS ONCE
Status: COMPLETED | OUTPATIENT
Start: 2021-05-13 | End: 2021-05-13

## 2021-05-12 RX ORDER — GABAPENTIN 300 MG/1
CAPSULE ORAL
Status: DISPENSED
Start: 2021-05-12 | End: 2021-05-12

## 2021-05-12 RX ORDER — CELECOXIB 100 MG/1
200 CAPSULE ORAL ONCE
Status: COMPLETED | OUTPATIENT
Start: 2021-05-12 | End: 2021-05-12

## 2021-05-12 RX ORDER — OXYBUTYNIN CHLORIDE 5 MG/1
5 TABLET, EXTENDED RELEASE ORAL 2 TIMES DAILY
Status: DISCONTINUED | OUTPATIENT
Start: 2021-05-12 | End: 2021-05-13 | Stop reason: HOSPADM

## 2021-05-12 RX ORDER — OXYCODONE HYDROCHLORIDE AND ACETAMINOPHEN 5; 325 MG/1; MG/1
1 TABLET ORAL PRN
Status: DISCONTINUED | OUTPATIENT
Start: 2021-05-12 | End: 2021-05-12 | Stop reason: HOSPADM

## 2021-05-12 RX ORDER — FENTANYL CITRATE 50 UG/ML
25 INJECTION, SOLUTION INTRAMUSCULAR; INTRAVENOUS EVERY 5 MIN PRN
Status: DISCONTINUED | OUTPATIENT
Start: 2021-05-12 | End: 2021-05-12 | Stop reason: HOSPADM

## 2021-05-12 RX ORDER — PHENYLEPHRINE HYDROCHLORIDE 10 MG/ML
INJECTION INTRAVENOUS PRN
Status: DISCONTINUED | OUTPATIENT
Start: 2021-05-12 | End: 2021-05-12 | Stop reason: SDUPTHER

## 2021-05-12 RX ORDER — LEVOTHYROXINE SODIUM 0.03 MG/1
25 TABLET ORAL DAILY
Status: DISCONTINUED | OUTPATIENT
Start: 2021-05-12 | End: 2021-05-13 | Stop reason: HOSPADM

## 2021-05-12 RX ORDER — LIDOCAINE HYDROCHLORIDE 10 MG/ML
5 INJECTION, SOLUTION INFILTRATION; PERINEURAL ONCE
Status: COMPLETED | OUTPATIENT
Start: 2021-05-12 | End: 2021-05-12

## 2021-05-12 RX ORDER — SODIUM CHLORIDE 9 MG/ML
INJECTION, SOLUTION INTRAVENOUS CONTINUOUS PRN
Status: DISCONTINUED | OUTPATIENT
Start: 2021-05-12 | End: 2021-05-12 | Stop reason: SDUPTHER

## 2021-05-12 RX ORDER — DEXAMETHASONE SODIUM PHOSPHATE 10 MG/ML
8 INJECTION, SOLUTION INTRAMUSCULAR; INTRAVENOUS ONCE
Status: COMPLETED | OUTPATIENT
Start: 2021-05-12 | End: 2021-05-12

## 2021-05-12 RX ORDER — SODIUM CHLORIDE 9 MG/ML
25 INJECTION, SOLUTION INTRAVENOUS PRN
Status: DISCONTINUED | OUTPATIENT
Start: 2021-05-12 | End: 2021-05-13 | Stop reason: HOSPADM

## 2021-05-12 RX ORDER — TRAMADOL HYDROCHLORIDE 50 MG/1
50 TABLET ORAL EVERY 6 HOURS
Status: DISCONTINUED | OUTPATIENT
Start: 2021-05-12 | End: 2021-05-13 | Stop reason: HOSPADM

## 2021-05-12 RX ORDER — MIDAZOLAM HYDROCHLORIDE 1 MG/ML
INJECTION INTRAMUSCULAR; INTRAVENOUS PRN
Status: DISCONTINUED | OUTPATIENT
Start: 2021-05-12 | End: 2021-05-12 | Stop reason: SDUPTHER

## 2021-05-12 RX ORDER — ROPIVACAINE HYDROCHLORIDE 5 MG/ML
30 INJECTION, SOLUTION EPIDURAL; INFILTRATION; PERINEURAL ONCE
Status: COMPLETED | OUTPATIENT
Start: 2021-05-12 | End: 2021-05-12

## 2021-05-12 RX ORDER — ACETAMINOPHEN 325 MG/1
650 TABLET ORAL EVERY 6 HOURS
Status: DISCONTINUED | OUTPATIENT
Start: 2021-05-12 | End: 2021-05-13 | Stop reason: HOSPADM

## 2021-05-12 RX ORDER — OXYCODONE HYDROCHLORIDE 5 MG/1
5 TABLET ORAL EVERY 4 HOURS PRN
Status: DISCONTINUED | OUTPATIENT
Start: 2021-05-12 | End: 2021-05-13 | Stop reason: HOSPADM

## 2021-05-12 RX ADMIN — FENTANYL CITRATE 50 MCG: 50 INJECTION, SOLUTION INTRAMUSCULAR; INTRAVENOUS at 08:59

## 2021-05-12 RX ADMIN — DEXAMETHASONE SODIUM PHOSPHATE 10 MG: 10 INJECTION, SOLUTION INTRAMUSCULAR; INTRAVENOUS at 11:04

## 2021-05-12 RX ADMIN — ACETAMINOPHEN 1000 MG: 500 TABLET ORAL at 07:32

## 2021-05-12 RX ADMIN — PHENYLEPHRINE HYDROCHLORIDE 100 MCG: 10 INJECTION INTRAVENOUS at 10:15

## 2021-05-12 RX ADMIN — OXYBUTYNIN CHLORIDE 5 MG: 5 TABLET, EXTENDED RELEASE ORAL at 15:35

## 2021-05-12 RX ADMIN — ASPIRIN 325 MG: 325 TABLET, COATED ORAL at 22:16

## 2021-05-12 RX ADMIN — BUPIVACAINE HYDROCHLORIDE IN DEXTROSE 2 ML: 7.5 INJECTION, SOLUTION SUBARACHNOID at 10:10

## 2021-05-12 RX ADMIN — KETOROLAC TROMETHAMINE 30 MG: 30 INJECTION, SOLUTION INTRAMUSCULAR; INTRAVENOUS at 20:23

## 2021-05-12 RX ADMIN — TRANEXAMIC ACID 1000 MG: 1 INJECTION, SOLUTION INTRAVENOUS at 10:25

## 2021-05-12 RX ADMIN — ACETAMINOPHEN 650 MG: 325 TABLET ORAL at 15:35

## 2021-05-12 RX ADMIN — CEFAZOLIN 3000 MG: 10 INJECTION, POWDER, FOR SOLUTION INTRAVENOUS at 20:23

## 2021-05-12 RX ADMIN — HYDROXYCHLOROQUINE SULFATE 200 MG: 200 TABLET ORAL at 22:17

## 2021-05-12 RX ADMIN — ONDANSETRON 4 MG: 2 INJECTION INTRAMUSCULAR; INTRAVENOUS at 11:42

## 2021-05-12 RX ADMIN — ROPIVACAINE HYDROCHLORIDE 30 ML: 5 INJECTION, SOLUTION EPIDURAL; INFILTRATION; PERINEURAL at 08:45

## 2021-05-12 RX ADMIN — FENTANYL CITRATE 50 MCG: 50 INJECTION, SOLUTION INTRAMUSCULAR; INTRAVENOUS at 12:22

## 2021-05-12 RX ADMIN — HYDROCHLOROTHIAZIDE 25 MG: 25 TABLET ORAL at 15:35

## 2021-05-12 RX ADMIN — PHENYLEPHRINE HYDROCHLORIDE 200 MCG: 10 INJECTION INTRAVENOUS at 10:57

## 2021-05-12 RX ADMIN — ACETAMINOPHEN 650 MG: 325 TABLET ORAL at 22:17

## 2021-05-12 RX ADMIN — SODIUM CHLORIDE: 9 INJECTION, SOLUTION INTRAVENOUS at 15:29

## 2021-05-12 RX ADMIN — PHENYLEPHRINE HYDROCHLORIDE 200 MCG: 10 INJECTION INTRAVENOUS at 10:39

## 2021-05-12 RX ADMIN — CEFAZOLIN 3000 MG: 10 INJECTION, POWDER, FOR SOLUTION INTRAVENOUS at 10:11

## 2021-05-12 RX ADMIN — MIDAZOLAM HYDROCHLORIDE 1 MG: 1 INJECTION, SOLUTION INTRAMUSCULAR; INTRAVENOUS at 08:30

## 2021-05-12 RX ADMIN — FENTANYL CITRATE 50 MCG: 50 INJECTION, SOLUTION INTRAMUSCULAR; INTRAVENOUS at 12:09

## 2021-05-12 RX ADMIN — TRAMADOL HYDROCHLORIDE 50 MG: 50 TABLET ORAL at 15:35

## 2021-05-12 RX ADMIN — PHENYLEPHRINE HYDROCHLORIDE 100 MCG: 10 INJECTION INTRAVENOUS at 11:19

## 2021-05-12 RX ADMIN — ROPIVACAINE HYDROCHLORIDE 30 ML: 5 INJECTION, SOLUTION EPIDURAL; INFILTRATION; PERINEURAL at 08:53

## 2021-05-12 RX ADMIN — SODIUM CHLORIDE: 9 INJECTION, SOLUTION INTRAVENOUS at 09:58

## 2021-05-12 RX ADMIN — GABAPENTIN 600 MG: 300 CAPSULE ORAL at 07:32

## 2021-05-12 RX ADMIN — TRAMADOL HYDROCHLORIDE 50 MG: 50 TABLET ORAL at 22:17

## 2021-05-12 RX ADMIN — TRANEXAMIC ACID 1000 MG: 1 INJECTION, SOLUTION INTRAVENOUS at 14:11

## 2021-05-12 RX ADMIN — LIDOCAINE HYDROCHLORIDE 5 ML: 10 INJECTION, SOLUTION INFILTRATION; PERINEURAL at 08:40

## 2021-05-12 RX ADMIN — SODIUM CHLORIDE: 9 INJECTION, SOLUTION INTRAVENOUS at 11:49

## 2021-05-12 RX ADMIN — PROPOFOL 75 MCG/KG/MIN: 10 INJECTION, EMULSION INTRAVENOUS at 10:15

## 2021-05-12 RX ADMIN — MIDAZOLAM 2 MG: 1 INJECTION INTRAMUSCULAR; INTRAVENOUS at 10:00

## 2021-05-12 RX ADMIN — CELECOXIB 200 MG: 100 CAPSULE ORAL at 07:32

## 2021-05-12 RX ADMIN — LEVOTHYROXINE SODIUM 25 MCG: 25 TABLET ORAL at 15:35

## 2021-05-12 ASSESSMENT — PULMONARY FUNCTION TESTS
PIF_VALUE: 0
PIF_VALUE: 0
PIF_VALUE: 1
PIF_VALUE: 0
PIF_VALUE: 1
PIF_VALUE: 0
PIF_VALUE: 1
PIF_VALUE: 1
PIF_VALUE: 0
PIF_VALUE: 1
PIF_VALUE: 0
PIF_VALUE: 1
PIF_VALUE: 0
PIF_VALUE: 1
PIF_VALUE: 0
PIF_VALUE: 1
PIF_VALUE: 0
PIF_VALUE: 1
PIF_VALUE: 0
PIF_VALUE: 1
PIF_VALUE: 0
PIF_VALUE: 1
PIF_VALUE: 0
PIF_VALUE: 1
PIF_VALUE: 0
PIF_VALUE: 1
PIF_VALUE: 0
PIF_VALUE: 1
PIF_VALUE: 0
PIF_VALUE: 1
PIF_VALUE: 0
PIF_VALUE: 1
PIF_VALUE: 0
PIF_VALUE: 1

## 2021-05-12 ASSESSMENT — PAIN DESCRIPTION - PAIN TYPE: TYPE: SURGICAL PAIN

## 2021-05-12 ASSESSMENT — PAIN DESCRIPTION - ORIENTATION: ORIENTATION: LEFT

## 2021-05-12 ASSESSMENT — PAIN SCALES - GENERAL
PAINLEVEL_OUTOF10: 0
PAINLEVEL_OUTOF10: 0
PAINLEVEL_OUTOF10: 3
PAINLEVEL_OUTOF10: 0
PAINLEVEL_OUTOF10: 0
PAINLEVEL_OUTOF10: 1
PAINLEVEL_OUTOF10: 0

## 2021-05-12 ASSESSMENT — PAIN DESCRIPTION - FREQUENCY
FREQUENCY: CONTINUOUS
FREQUENCY: CONTINUOUS

## 2021-05-12 ASSESSMENT — PAIN - FUNCTIONAL ASSESSMENT
PAIN_FUNCTIONAL_ASSESSMENT: ACTIVITIES ARE NOT PREVENTED
PAIN_FUNCTIONAL_ASSESSMENT: PREVENTS OR INTERFERES SOME ACTIVE ACTIVITIES AND ADLS

## 2021-05-12 ASSESSMENT — PAIN DESCRIPTION - PROGRESSION: CLINICAL_PROGRESSION: GRADUALLY WORSENING

## 2021-05-12 ASSESSMENT — PAIN DESCRIPTION - DESCRIPTORS
DESCRIPTORS: ACHING
DESCRIPTORS: ACHING;DISCOMFORT

## 2021-05-12 ASSESSMENT — PAIN DESCRIPTION - LOCATION: LOCATION: KNEE

## 2021-05-12 ASSESSMENT — PAIN DESCRIPTION - ONSET: ONSET: ON-GOING

## 2021-05-12 NOTE — DISCHARGE SUMMARY
Patient ID:  Delvin Ward  [id number]  24 y.o.  1970    Admit date: 5/12/2021    Discharge date and time:  5/12/2021    Admitting Physician: VICKEY Chinchilla MD     Discharge Physician: Alka Hargrove. Carlitos Chinchilla MD    Admission Diagnoses: left Knee post traumatic Osteoarthritis, left knee rheumatoid arthritis    Discharge Diagnoses: Same    Admission Condition: Good    Discharged Condition: Good    Procedure and Date: Allan left Total Knee Arthroplasty     Hospital Course: A Allan total knee arthroplasty was performed on 5/12/2021. Following this procedure the patient was admitted for a 1 day postoperative hospital stay. During this admission, DVT prophylaxis was followed using bilateral ICDs, SONNY hose, and daily ASA. Post-operative pain control was achieved with the use of IV/oral pain medications. Discharge plans were discussed with the patient with the aid of . Consults: Hospitalist    Disposition: Home    Patient Instructions:     Medications for pain:    Acetaminophen - Take one tablet every 6 hours for 1 week. Then take every 6 hours as needed for pain. Tramadol - Take one tablet every 6 hours for 1 week. Then take every 6 hours as needed for pain. Oxycodone 1 tablet every 4 hours as needed for pain. You can take 2 every 6 hours for severe pain. Medication for DVT prophylaxis (Prevention of blood clots)  Aspirin - Take 1 tablet daily for 6 weeks for prevention of blood clots    Medication for constipation:  Colace - Take 1 tablet every 12 hours as needed for constipation        Activity: activity as tolerated  Diet: regular diet  Wound Care: Patient should remove dressing in 9 days. Patient can shower with the dressing on but should not bathe. After removing, the dressing keep incision clean and dry    Follow-up with Dr. Joceline Rmasay in 2 weeks.

## 2021-05-12 NOTE — PROGRESS NOTES
Occupational Therapy  OCCUPATIONAL THERAPY INITIAL EVALUATION      Date:2021  Patient Name: Leilani Paul  MRN: [id number]  : 1970  Room: [address] [place] [address]-    Referring Provider: El Power MD  Evaluating OT: Queen Ender. Leonardo [email or web address]    AM-PAC Daily Activity Raw Score: 16/24    Recommended Adaptive Equipment: Continue to assess. Diagnosis: Post-traumatic osteoarthritis of left knee [M17.32]   Surgery: Patient underwent L TKA on 2021. Pertinent Medical History: HTN, RA, urinary urgency      Precautions: falls, FWBAT    Home Living: Patient lives with her mother (who is independent with ADLs and some IADLs) in a two-floor home; patient's bedroom and (only) bathroom are on the second floor. Bathroom Setup: tub shower (with tub ledge rail and shower seat) and elevated toilet seat  Equipment Owned: walker    Prior Level of Function (PLOF): Patient reported that she was independent with ADLs, most IADLs, and functional mobility (without device) prior to this hospitalization. Patient noted that her mother can provide some assistance with ADLs and household-based IADLs. Patient stated that other family members who live locally can provide intermittent assistance with IADLs, as needed. Driving: Yes - patient is the primary  of the household    Pain Level: Patient indicated experiencing pain in her L knee, but did not rate her pain. Cognition: Patient alert and oriented x3. WFL command follow demonstrated. Patient pleasant, cooperative, and motivated to return to [facility] and home environment. Memory: WFL   Sequencing: WFL   Problem Solving: WFL grossly   Judgement/Safety: WFL grossly    Functional Assessment:   Initial Eval Status  Date: 2021 Treatment Status  Date:  Short Term Goals   Feeding Independent     Grooming CGA  Mod I  (seated/standing at sink)   UB Dressing SBA  Mod I  (including item retrieval)   LB Dressing Max A to don disposable brief. Max A to don socks.   Mod I / Independent - with use of AE, as needed/appropriate   Bathing Mod A  Mod I - with use of AE/DME, as needed/appropriate   Toileting Mod A for hygiene while standing with walker following evidence of urinary incontinence. Mod I   Bed Mobility  Supine-to-Sit: Min A      Functional Transfers Sit-to-Stand: CGA   from EOB  Independent   Functional Mobility CGA   (with walker) for household distance within patient's room and hallway. Mod I with functional mobility (with device, as needed/appropriate) in order to maximize independence with ADLs/IADLs and other functional tasks. Balance Sitting: Good  (at EOB)  Standing: Fair-  (with walker)  Fair+ dynamic standing balance during completion of ADLs/IADLs and other functional tasks. Activity Tolerance Fair+  Patient will demonstrate Good understanding and consistent implementation of energy conservation techniques and work simplification techniques into ADL/IADL routines. Visual/  Perceptual WFL     N/A   B UE Strength 4/5  Patient will demonstrate 4+/5 B UE strength in order to maximize independence with ADLs/IADLs and functional transfers. Long-Term Goal: Patient will increase functional independence to PLOF in order to allow patient to live in least restrictive environment. ROM: Additional Information:    R UE  WFL    L UE WFL      Hearing: WFL  Sensation: No complaints of numbness/tingling in B UEs. Tone: WFL  Edema: No    Comments: RN approved patient's participation in [address] [place] [address] activities. Upon arrival, patient supine in bed. At end of session, patient seated in bedside chair with call light and phone within reach and all lines and tubes intact. Patient would benefit from continued skilled OT to increase safety and independence with completion of ADL/IADL tasks for functional independence and quality of life. Treatment: OT treatment provided this date included:    Instruction/training on safety and adapted techniques for completion of ADLs.     Instruction/training on safe functional mobility/transfer techniques. Patient education provided regardin) importance of having staff assistance with ADLs and other OOB activities to prevent falls/injury during hospitalization. Patient verbalized understanding. Further skilled OT treatment indicated to increase patient's safety and independence with completion of ADL/IADL tasks in order to maximize patient's functional independence and quality of life.     Assessment of Current Deficits:   Functional Mobility [x]  ADLs [x] Strength [x]  Cognition []  Functional Transfers  [x] IADLs [x] Safety Awareness [x]  Endurance [x]  Fine Motor Coordination [] Balance [x] Vision/Perception [] Sensation []   Gross Motor Coordination [] ROM [] Delirium []                  Motor Control []    Plan of Care: 2-5 days/week for 1-2 weeks PRN  [x]ADL retraining/adaptive techniques and AE recommendations to increase functional independence within precautions  [x]Energy conservation techniques to improve tolerance for ADLs/IADLs  [x]Functional transfer/mobility training/DME recommendations for increased independence, safety and fall prevention  [x]Patient/family education to increase safety and functional independence  [x]Environmental modifications for safe mobility and completion of ADLs/IADLs  []Cognitive retraining exercises to improve problem solving skills & safe participation in ADLs/IADLs   []Sensory re-education techniques to improve extremity awareness, maintain skin integrity and improve hand function   []Visual/Perceptual retraining  to improve body awareness and safety during transfers and ADLs   []Splinting/positioning needs to maintain joint/skin integrity and prevent contractures   [x]Therapeutic activity to improve functional performance during ADLs/IADLs  [x]Therapeutic exercise to improve tolerance and functional strength for ADLs/IADLs  [x]Balance retraining/tolerance tasks for facilitation of postural

## 2021-05-12 NOTE — PROGRESS NOTES
Left adductor canal block completed by Dr. Jamshid Nieto, patient tolerated procedure, mother at bedside

## 2021-05-12 NOTE — INTERVAL H&P NOTE
Update History & Physical    H&P reviewed. No changes.     Electronically signed by Nuria Cooper MD on 5/12/2021 at 7:18 AM

## 2021-05-12 NOTE — ANESTHESIA PROCEDURE NOTES
Spinal Block    Patient location during procedure: OR  Start time: 5/12/2021 10:05 AM  End time: 5/12/2021 10:10 AM  Reason for block: primary anesthetic and at surgeon's request  Staffing  Performed: anesthesiologist   Anesthesiologist: Bala Ramirez MD  Preanesthetic Checklist  Completed: patient identified, IV checked, site marked, risks and benefits discussed, surgical consent, monitors and equipment checked, pre-op evaluation, timeout performed, anesthesia consent given, oxygen available and patient being monitored  Spinal Block  Patient position: sitting  Prep: ChloraPrep  Patient monitoring: cardiac monitor, continuous pulse ox and frequent blood pressure checks  Approach: midline  Location: L3/L4  Provider prep: mask and sterile gloves  Local infiltration: lidocaine  Agent: bupivacaine  Dose: 2  Dose: 2  Needle  Needle type: Pencan   Needle gauge: 25 G  Needle length: 3.5 in  Assessment  Sensory level: T6  Swirl obtained: Yes  CSF: clear  Attempts: 1  Hemodynamics: stable

## 2021-05-12 NOTE — OP NOTE
Operative Report  Kassidy Patel  [id number]  5/12/2021    Pre-operative diagnosis:  1. Left knee post traumatic osteoarthritis      2. Left knee rheumatoid arthritis    Post-operative diagnosis:  1. Left knee post traumatic osteoarthritis      2. Left knee rheumatoid arthritis    Procedure: Allan Left Total Knee Arthroplasty    Surgeon: Simon Bradford MD    Assistant:  Nayeli Kaur RN    Anesthesia:  Spinal     Operative Indication: Laura Barcenas is a 45 y/o female who presents with left knee post traumatic osteoarthritis and rheumatoid arthritis. The pain has become a quality of life issue and the patient has failed nonoperative treatment. She presents for a left total knee arthroplasty. The risks and benefits of surgery were discussed and all questions were answered. The risks for surgery that were discussed include bleeding, infection, damage to blood vessels, damage to nerves, risk of further surgery, restricted range of motion, risk of continued discomfort, risk of need for further reconstructive procedures, risk of need for altered activities and altered gait, risk of blood clots, pulmonary embolism, myocardial infarction, and risk of death were discussed. The patient understood these risks and consented for surgery. The typical post-operative recovery process was also discussed. EBL: 50 ml    Tourniquet time: 840 min    Complications: None    Components: 1. Ashlee triatholon femur, size 3, CR, press fit                         2. Ashlee triatholon tibia, size 4, press fit    3. Poly 9 mm     4. Patella, size 33, press fit    Operative Procedure: The patient was taken to the operating room and was given spinal anaesthesia. The patient was properly positioned on the table and a tourniquet was applied to the upper thigh. The leg was then prepped and draped in the usual sterile fashion. A timeout was called identifying the patient, prodedure and the adminstration of IV Ancef and TXA.     The patient had a previous midline incision that was used in the past for ORIF tibial plateau and hardware removal.  The incision was used. An ellipse was made through the skin around the previous scar. I was then carried down throught the subcutaneous tissue. A medial parapatellar arthrotomy was used. The anterior synovium and fat pad were excised and the proximal tibia was exposed. The ACL and medial and lateral meniscus were transected. 2 Karyna pins were then placed in the distal femur. A Flypaper satellite array was then affixed. 2 additional small skin incisions were made over the proximal tibia and 2 Karyna pins were placed. A Allan satellite array was then affixed. At this point, 2 check points were placed on the femur and tibia. Anatomical landmarks on the ankle were registered. The femur and tibia were then registered and verified. The patient was noted to have a 23° varus deformity 10° flexion contracture. Her ROM was noted to 23-93°. Osteophytes were then removed with a rongeur. With valgus and varus stresses applied, poses were captured at 0° 90°. Small modifications were made to implant position using the [facility] software to create balanced flexion and extension gaps. The [employer] robot was then used to make cuts in the distal femur, anterior and poster chamfer, anterior femur, posterior condyles and proximal tibia. The medial and lateral meniscus was then excised. The knee was then trialed with a size 3 femur and size 4 tibia and a 9 mm poly. The knee felt balanced in flexion and extension and this was confirmed with the [address]. ROM was noted to be 0-130°    A patellar osteotomy was performed with the patellar reamer and cleaned up with a saw. A size 33 patella was then selected. All trial components were removed and the knee was thoroughly irrigated with normal saline. The implants were then press fit. A size 9 trial poly was used and the knee was again found to be balanced in flexion and extension. The poly was then inserted. The knee was then thoroughly irrigated with normal saline. The Ranawat cocktail was then injected into the soft tissues prior to closure. The wound was then closed with 1, 2-0, 3-0 Stratofix, dermabond and steri strips. A sterile dressing was then applied and the knee was wrapped with an ACE wrap. The tourniquet was then dropped. There were no complications and the patient tolerated the procedure well. The patient was taken to the recovery room in stable condition.     Amilcar Perez MD

## 2021-05-13 VITALS
SYSTOLIC BLOOD PRESSURE: 133 MMHG | TEMPERATURE: 97.8 F | HEIGHT: 65 IN | WEIGHT: 276 LBS | OXYGEN SATURATION: 95 % | BODY MASS INDEX: 45.98 KG/M2 | DIASTOLIC BLOOD PRESSURE: 95 MMHG | HEART RATE: 83 BPM | RESPIRATION RATE: 16 BRPM

## 2021-05-13 PROBLEM — M17.32 POST-TRAUMATIC OSTEOARTHRITIS OF LEFT KNEE: Status: RESOLVED | Noted: 2021-05-12 | Resolved: 2021-05-13

## 2021-05-13 LAB
ANION GAP SERPL CALCULATED.3IONS-SCNC: 10 MMOL/L (ref 7–16)
BUN BLDV-MCNC: 20 MG/DL (ref 6–20)
CALCIUM SERPL-MCNC: 8.6 MG/DL (ref 8.6–10.2)
CHLORIDE BLD-SCNC: 102 MMOL/L (ref 98–107)
CO2: 24 MMOL/L (ref 22–29)
CREAT SERPL-MCNC: 0.8 MG/DL (ref 0.5–1)
GFR AFRICAN AMERICAN: >60
GFR NON-AFRICAN AMERICAN: >60 ML/MIN/1.73
GLUCOSE BLD-MCNC: 129 MG/DL (ref 74–99)
HCT VFR BLD CALC: 38.1 % (ref 34–48)
HEMOGLOBIN: 12.3 G/DL (ref 11.5–15.5)
MCH RBC QN AUTO: 29.6 PG (ref 26–35)
MCHC RBC AUTO-ENTMCNC: 32.3 % (ref 32–34.5)
MCV RBC AUTO: 91.8 FL (ref 80–99.9)
PDW BLD-RTO: 14.6 FL (ref 11.5–15)
PLATELET # BLD: 216 E9/L (ref 130–450)
PMV BLD AUTO: 10.4 FL (ref 7–12)
POTASSIUM REFLEX MAGNESIUM: 3.8 MMOL/L (ref 3.5–5)
RBC # BLD: 4.15 E12/L (ref 3.5–5.5)
SODIUM BLD-SCNC: 136 MMOL/L (ref 132–146)
WBC # BLD: 13.5 E9/L (ref 4.5–11.5)

## 2021-05-13 PROCEDURE — 6360000002 HC RX W HCPCS: Performed by: ORTHOPAEDIC SURGERY

## 2021-05-13 PROCEDURE — 80048 BASIC METABOLIC PNL TOTAL CA: CPT

## 2021-05-13 PROCEDURE — 96375 TX/PRO/DX INJ NEW DRUG ADDON: CPT

## 2021-05-13 PROCEDURE — 96365 THER/PROPH/DIAG IV INF INIT: CPT

## 2021-05-13 PROCEDURE — 96366 THER/PROPH/DIAG IV INF ADDON: CPT

## 2021-05-13 PROCEDURE — 97530 THERAPEUTIC ACTIVITIES: CPT

## 2021-05-13 PROCEDURE — 85027 COMPLETE CBC AUTOMATED: CPT

## 2021-05-13 PROCEDURE — 97535 SELF CARE MNGMENT TRAINING: CPT

## 2021-05-13 PROCEDURE — 97110 THERAPEUTIC EXERCISES: CPT

## 2021-05-13 PROCEDURE — 96376 TX/PRO/DX INJ SAME DRUG ADON: CPT

## 2021-05-13 PROCEDURE — 2580000003 HC RX 258: Performed by: ORTHOPAEDIC SURGERY

## 2021-05-13 PROCEDURE — G0378 HOSPITAL OBSERVATION PER HR: HCPCS

## 2021-05-13 PROCEDURE — 36415 COLL VENOUS BLD VENIPUNCTURE: CPT

## 2021-05-13 PROCEDURE — 6370000000 HC RX 637 (ALT 250 FOR IP): Performed by: ORTHOPAEDIC SURGERY

## 2021-05-13 RX ORDER — OXYCODONE HYDROCHLORIDE 5 MG/1
5 TABLET ORAL EVERY 4 HOURS PRN
Qty: 42 TABLET | Refills: 0 | Status: SHIPPED | OUTPATIENT
Start: 2021-05-13 | End: 2021-05-20

## 2021-05-13 RX ORDER — ACETAMINOPHEN 325 MG/1
325 TABLET ORAL EVERY 6 HOURS
Qty: 240 TABLET | Refills: 0 | Status: SHIPPED | OUTPATIENT
Start: 2021-05-13

## 2021-05-13 RX ORDER — TRAMADOL HYDROCHLORIDE 50 MG/1
50 TABLET ORAL EVERY 6 HOURS
Qty: 28 TABLET | Refills: 0 | Status: SHIPPED | OUTPATIENT
Start: 2021-05-13 | End: 2021-05-20

## 2021-05-13 RX ORDER — SENNA AND DOCUSATE SODIUM 50; 8.6 MG/1; MG/1
1 TABLET, FILM COATED ORAL 2 TIMES DAILY
Qty: 30 TABLET | Refills: 0 | Status: SHIPPED | OUTPATIENT
Start: 2021-05-13

## 2021-05-13 RX ADMIN — ASPIRIN 325 MG: 325 TABLET, COATED ORAL at 08:31

## 2021-05-13 RX ADMIN — CEFAZOLIN 3000 MG: 10 INJECTION, POWDER, FOR SOLUTION INTRAVENOUS at 02:36

## 2021-05-13 RX ADMIN — KETOROLAC TROMETHAMINE 30 MG: 30 INJECTION, SOLUTION INTRAMUSCULAR; INTRAVENOUS at 02:36

## 2021-05-13 RX ADMIN — OXYBUTYNIN CHLORIDE 5 MG: 5 TABLET, EXTENDED RELEASE ORAL at 08:31

## 2021-05-13 RX ADMIN — TRAMADOL HYDROCHLORIDE 50 MG: 50 TABLET ORAL at 04:15

## 2021-05-13 RX ADMIN — ACETAMINOPHEN 650 MG: 325 TABLET ORAL at 10:01

## 2021-05-13 RX ADMIN — HYDROCHLOROTHIAZIDE 25 MG: 25 TABLET ORAL at 08:31

## 2021-05-13 RX ADMIN — KETOROLAC TROMETHAMINE 30 MG: 30 INJECTION, SOLUTION INTRAMUSCULAR; INTRAVENOUS at 08:31

## 2021-05-13 RX ADMIN — LEVOTHYROXINE SODIUM 25 MCG: 25 TABLET ORAL at 06:23

## 2021-05-13 RX ADMIN — TRAMADOL HYDROCHLORIDE 50 MG: 50 TABLET ORAL at 10:01

## 2021-05-13 RX ADMIN — DOCUSATE SODIUM 50 MG AND SENNOSIDES 8.6 MG 1 TABLET: 8.6; 5 TABLET, FILM COATED ORAL at 08:31

## 2021-05-13 RX ADMIN — SODIUM CHLORIDE, PRESERVATIVE FREE 10 ML: 5 INJECTION INTRAVENOUS at 08:32

## 2021-05-13 RX ADMIN — DEXAMETHASONE SODIUM PHOSPHATE 10 MG: 10 INJECTION INTRAMUSCULAR; INTRAVENOUS at 06:23

## 2021-05-13 RX ADMIN — ACETAMINOPHEN 650 MG: 325 TABLET ORAL at 04:15

## 2021-05-13 RX ADMIN — HYDROXYCHLOROQUINE SULFATE 200 MG: 200 TABLET ORAL at 08:30

## 2021-05-13 ASSESSMENT — PAIN SCALES - GENERAL: PAINLEVEL_OUTOF10: 3

## 2021-05-13 NOTE — PROGRESS NOTES
Occupational Therapy  OT BEDSIDE TREATMENT NOTE      Date:2021  Patient Name: Jorge Ling  MRN: [id number]  : 1970  Room: [address] [place] [address]A     Referring Provider: Tien Ren MD  Evaluating OT: Herb Miles. Shonda, OTR/L - MG.     AM-PAC Daily Activity Raw Score: 19/24     Recommended Adaptive Equipment: Continue to assess.     Diagnosis: Post-traumatic osteoarthritis of left knee [M17.32]   Surgery: Patient underwent L TKA on 2021. Pertinent Medical History: HTN, RA, urinary urgency      Precautions: falls, FWBAT     Home Living: Patient lives with her mother (who is independent with ADLs and some IADLs) in a two-floor home; patient's bedroom and (only) bathroom are on the second floor. Bathroom Setup: tub shower (with tub ledge rail and shower seat) and elevated toilet seat  Equipment Owned: walker     Prior Level of Function (PLOF): Patient reported that she was independent with ADLs, most IADLs, and functional mobility (without device) prior to this hospitalization. Patient noted that her mother can provide some assistance with ADLs and household-based IADLs. Patient stated that other family members who live locally can provide intermittent assistance with IADLs, as needed. Driving: Yes - patient is the primary  of the household     Pain Level: L knee- Mild  Cognition: Patient alert and oriented x4  Functional Assessment:    Initial Eval Status  Date: 2021 Treatment Status  Date: 21 Short Term Goals   Feeding Independent       Grooming CGA   Mod I  (seated/standing at sink)   UB Dressing SBA   Mod I  (including item retrieval)   LB Dressing Max A to don disposable brief. Max A to don socks.  SBA with mod vc/ demos for AE management.   See comments Mod I / Alonso Mano - with use of AE, as needed/appropriate   Bathing Mod A   Mod I - with use of AE/DME, as needed/appropriate   Toileting Mod A for hygiene while standing with walker following evidence of urinary incontinence.   Mod I   Bed Mobility  Supine-to-Sit: Min A   Supine<> sit: SBA     Functional Transfers Sit-to-Stand: CGA   from EOB  STS: SBA from EOB Independent   Functional Mobility CGA   (with walker) for household distance within patient's room and hallway.  SBA using ww to side step towards [facility] Mod I with functional mobility (with device, as needed/appropriate) in order to maximize independence with ADLs/IADLs and other functional tasks. Balance Sitting: Good  (at EOB)  Standing: Fair-  (with walker) Sitting: Independent  Standing: SBA  Fair+ dynamic standing balance during completion of ADLs/IADLs and other functional tasks. Activity Tolerance Fair+  Fair+ Patient will demonstrate Good understanding and consistent implementation of energy conservation techniques and work simplification techniques into ADL/IADL routines. Visual/  Perceptual WFL      N/A   B UE Strength 4/5  B UE WFL during tx Patient will demonstrate 4+/5 B UE strength in order to maximize independence with ADLs/IADLs and functional        Treatment: Upon arrival pt was supine in bed. Pt noted owning reacher/ sock aid but required cues and demos for AE management when donning/ doffing socks. Pt able to doff/ don shorts without AE. Educated pt on use of easy- glide to decrease difficulty managing SONNY hose. Pt declined need for DME. Per pt she will sponge bathe at home until strength improves. Pt declined additional questions/ needs for therapist.  At end of session pt was assisted to bed with alarm on, all lines and tubes intact and call light within reach. Education: AE management, additional compensatory EL dressing techniques and safety training. · Pt has made good progress towards set goals. Plan of Care: 2-5 days/week for 1-2 weeks PRN  [x]? ?ADL retraining/adaptive techniques and AE recommendations to increase functional independence within precautions  [x]? ? Energy conservation techniques to improve tolerance for ADLs/IADLs  [x]? ? Functional transfer/mobility training/DME recommendations for increased independence, safety and fall prevention  [x]? ?Patient/family education to increase safety and functional independence  [x]? ? Environmental modifications for safe mobility and completion of ADLs/IADLs  []? ?Cognitive retraining exercises to improve problem solving skills & safe participation in ADLs/IADLs   []? ?Sensory re-education techniques to improve extremity awareness, maintain skin integrity and improve hand function   []? ? Visual/Perceptual retraining  to improve body awareness and safety during transfers and ADLs   []? ? Splinting/positioning needs to maintain joint/skin integrity and prevent contractures   [x]? ? Therapeutic activity to improve functional performance during ADLs/IADLs  [x]? ? Therapeutic exercise to improve tolerance and functional strength for ADLs/IADLs  [x]? ? Balance retraining/tolerance tasks for facilitation of postural control with dynamic challenges during ADLs   [x]? ? Neuromuscular re-education: facilitate righting/equilibrium reactions, midline orientation, scapular stability/mobility, normalize muscle tone, and facilitate active functional movement/attention  []? ? Delirium prevention/treatment   []? Positioning to improve functional independence and prevent skin breakdown   []? ?Other:     Treatment Charges: Mins Units   Ther Ex  [id number]     Manual Therapy Shayan Smith [phone]   ADL/Home Mgt [id number] 1   Neuro Re-ed [id number]     Group Therapy      Orthotic manage/training  [id number]     Non-Billable Time       Time In: 8:30  Time Out: 8:48  Total [facility]: [address] [facility]I [id number]

## 2021-05-13 NOTE — CARE COORDINATION
Met with patient about diagnosis and discharge plan of care. Pt lives with mom in 2 story home. Has all DME. Pt has hx of surgery in 2020.  Plan is home with Carmen today-hank

## 2021-05-13 NOTE — PROGRESS NOTES
POD#1 left TKA. Doing well minimal pain    Left knee dressing clean, dry and intact.  NV intact    Discussed dc home today    [employer] PA-C

## 2021-05-13 NOTE — PLAN OF CARE
Problem: Skin Integrity:  Goal: Will show no infection signs and symptoms  Description: Will show no infection signs and symptoms  5/13/2021 0054 by Kwasi Patiño  Outcome: Met This Shift  5/12/2021 1926 by Derek Covington RN  Outcome: Met This Shift  Goal: Absence of new skin breakdown  Description: Absence of new skin breakdown  5/13/2021 0054 by Kwasi Patiño  Outcome: Met This Shift  5/12/2021 1926 by Derek Covington RN  Outcome: Met This Shift     Problem: Falls - Risk of:  Goal: Will remain free from falls  Description: Will remain free from falls  5/13/2021 0054 by Kwasi Patiño  Outcome: Met This Shift  5/12/2021 1926 by Derek Covington RN  Outcome: Met This Shift  Goal: Absence of physical injury  Description: Absence of physical injury  5/13/2021 0054 by Kwasi Patiño  Outcome: Met This Shift  5/12/2021 1926 by Derek Covington RN  Outcome: Met This Shift     Problem: Mobility - Impaired:  Goal: Mobility will improve  Description: Mobility will improve  Outcome: Met This Shift

## 2021-05-13 NOTE — PROGRESS NOTES
Physical Therapy  Facility/Department: [address] ORTHO SURGERY  Daily Treatment Note  NAME: Delvin Ward  : 1970  MRN: [id number]    Date of Service: 2021      Patient Diagnosis(es): The primary encounter diagnosis was Preop testing. Diagnoses of Post-op pain and Post-traumatic osteoarthritis of left knee were also pertinent to this visit. has a past medical history of Hypertension, Rheumatoid arthritis ([address] Thyroid disease, and Urinary urgency. has a past surgical history that includes Total knee arthroplasty (Right, 10/14/2020); Lung removal, partial; Tonsillectomy; Leg Surgery (Left, 2021); and fracture surgery (Left, ). Evaluating Therapist: Darvin Tabares PT      Referring Provider:  Dr. Pardeep Rubio #:  [id number]   DIAGNOSIS:  Traumatic OA  S/p L TKA 2021   PRECAUTIONS: falls, FWBAT      Social:  Pt lives with  Mother  in a  2  floor plan  1  Step to enter. 1 HR on steps to second floor. Prior to admission pt walked with  No AD. Has ww        Initial Evaluation  Date:  2021  Treatment      AM  Short Term/ Long Term   Goals   Was pt agreeable to Eval/treatment? yes   yes      Does pt have pain?  L knee   L knee      Bed Mobility  Rolling:  NT   Supine to sit:  Min assist   Sit to supine:  NT   Scooting:  SBA   sit to supine : SBA   SBA    Transfers Sit to stand:  CGA   Stand to sit:  CGA   Stand pivot:  CGA   Sit <> stand : SBA   SBA    Ambulation     120  feet with  ww  with CGA   200 and 100 feet x 1 with ww SBA   200 feet with  ww  with  SBA          Stair negotiation: ascended and descended NT  4 steps with B HRs SBA   4 steps  X 2 with 1 HR and cane SBA /CGA  4-12  steps with  1-2 rail with SBA    LE ROM  AAROM L knee 5-85 degrees        LE strength  4-/ 5 L knee in available range        AM- PAC RAW score                  Pt is alert and Oriented x  3       Balance:  SBA      Endurance: WFL   Bed/Chair alarm: no      ASSESSMENT    Pt displays functional ability as noted in the objective portion of this treatment            Treatment/Education:     Mobility as above. Cues for hand and foot placement with transfers. Cues for ww safety with gait. Reciprocal gait pattern. Cues for sequencing and technique with stair negotiation. Instructed in proper car transfer technique. Performed supine QS, heel slides, and SAQs with assist as needed and cues for proper technique      Pt educated on fall risk, LE exercises, safe and proper technique with mobility, PT POC         Patient response to education:   Pt verbalized understanding Pt demonstrated skill Pt requires further education in this area   x  with cues  x         Comments:  Pt left  In bed after session, with call light in reach.             PLAN    PT care will be provided in accordance with the objectives noted above. Whenever appropriate, clear delegation orders will be provided for nursing staff. Exercises and functional mobility practice will be used as well as appropriate assistive devices or modalities to obtain goals. Patient and family education will also be administered as needed.           PLAN OF CARE:     Current Treatment Recommendations      [x]? Strengthening     [x]? ROM   [x]? Balance Training   [x]? Endurance Training   [x]? Transfer Training   [x]? Gait Training   [x]? Stair Training   [x]? Positioning   [x]? Safety and Education Training   [x]? Patient/Caregiver Education   []? HEP  []? Other         Frequency of treatments will be BID x 3 days.     Time in: 0758   Time out: 0825         Con't with PT POC, plan is home today.      CPT codes:  []? Low Complexity PT evaluation [id number]  []? Moderate Complexity PT evaluation 51600  []? High Complexity PT evaluation [id number]  []? PT Re-evaluation [id number]  []? Gait training 55189  minutes  [x]? Therapeutic activities 70898 15 minutes  [x]? Therapeutic exercises 97637 10 minutes  []?  Neuromuscular reeducation 67928  minutes         [facility] [address] number:  PT [phone]

## 2021-05-13 NOTE — PROGRESS NOTES
CLINICAL PHARMACY NOTE: MEDS TO [address] [place] [address] Drive Select Patient?: No  Total # of Prescriptions Filled: 4   The following medications were delivered to the patient:  · Oxycodone 5  · Aspirin 325  · Tramadol 50  · laxative  Total # of Interventions Completed: 6  Time Spent (min): 45    Additional Documentation:

## 2023-02-16 ENCOUNTER — HOSPITAL ENCOUNTER (OUTPATIENT)
Dept: PULMONOLOGY | Age: 53
Discharge: HOME | End: 2023-02-16
Payer: COMMERCIAL

## 2023-02-16 DIAGNOSIS — R06.00 DYSPNEA, UNSPECIFIED TYPE: ICD-10-CM

## 2023-02-16 DIAGNOSIS — M05.79 RHEUMATOID ARTHRITIS WITH RHEUMATOID FACTOR OF MULTIPLE SITES WITHOUT ORGAN OR SYSTEMS INVOLVEMENT (HCC): ICD-10-CM

## 2023-02-16 DIAGNOSIS — R60.9 EDEMA, UNSPECIFIED TYPE: ICD-10-CM

## 2023-02-16 PROCEDURE — 94729 DIFFUSING CAPACITY: CPT

## 2023-02-16 PROCEDURE — 94060 EVALUATION OF WHEEZING: CPT

## (undated) DEVICE — INSTRUMENT CURRETTES REUSABLE

## (undated) DEVICE — TOWEL,OR,DSP,ST,BLUE,STD,6/PK,12PK/CS: Brand: MEDLINE

## (undated) DEVICE — BNDG,ELSTC,MATRIX,STRL,6"X5YD,LF,HOOK&LP: Brand: MEDLINE

## (undated) DEVICE — 4-PORT MANIFOLD: Brand: NEPTUNE 2

## (undated) DEVICE — SOLUTION IV IRRIG POUR BRL 0.9% SODIUM CHL 2F7124

## (undated) DEVICE — TRAY STRYKER MAKO LEG POSITIONER INSTR

## (undated) DEVICE — ADHESIVE SKIN CLSR 0.7ML TOP DERMBND ADV

## (undated) DEVICE — Z DISCONTINUED USE 2275686 GLOVE SURG SZ 8 L12IN FNGR THK13MIL WHT ISOLEX POLYISOPRENE

## (undated) DEVICE — READY WET SKIN SCRUB TRAY-LF: Brand: MEDLINE INDUSTRIES, INC.

## (undated) DEVICE — PADDING UNDERCAST W6INXL4YD WYTEX 6 PER BG

## (undated) DEVICE — SPONGE LAP W18XL18IN WHT COT 4 PLY FLD STRUNG RADPQ DISP ST

## (undated) DEVICE — TRAY STRYKER MAKO TOTAL KNEE ARRAY

## (undated) DEVICE — DOUBLE BASIN SET: Brand: MEDLINE INDUSTRIES, INC.

## (undated) DEVICE — BOOT POS LEG DEMAYO

## (undated) DEVICE — TOTAL KNEE PK

## (undated) DEVICE — GAUZE,SPONGE,4"X4",8PLY,STRL,LF,10/TRAY: Brand: MEDLINE

## (undated) DEVICE — SUTURE STRATAFIX SYMMETRIC SZ 1 L18IN ABSRB VLT CT1 L36CM SXPP1A404

## (undated) DEVICE — INSTRUMENT SYSTEM 7 BATTERY REUSABLE

## (undated) DEVICE — Z DISCONTINUED USE 2744636  DRESSING AQUACEL 14 IN ALG W3.5XL14IN POLYUR FLM CVR W/ HYDRCOLL

## (undated) DEVICE — GOWN,SIRUS,POLYRNF,BRTHSLV,XL,30/CS: Brand: MEDLINE

## (undated) DEVICE — 3M™ IOBAN™ 2 ANTIMICROBIAL INCISE DRAPE 6651EZ: Brand: IOBAN™ 2

## (undated) DEVICE — PIN BNE FIX TEMP L140MM DIA4MM MAKO

## (undated) DEVICE — KIT DRP FOR RIO ROBOTIC ARM ASST SYS

## (undated) DEVICE — DRAPE,REIN 53X77,STERILE: Brand: MEDLINE

## (undated) DEVICE — PADDING CAST W6INXL4YD ST COT COHESIVE HND TEARABLE SPEC

## (undated) DEVICE — Device

## (undated) DEVICE — BANDAGE COMPR W6INXL12FT SMOOTH FOR LIMB EXSANG ESMARCH

## (undated) DEVICE — PIN BNE FIX TEMP L110MM DIA4MM MAKO

## (undated) DEVICE — SUTURE STRATAFIX SPRL MCRYL + SZ 3-0 L18IN ABSRB UD PS-2 SXMP1B107

## (undated) DEVICE — 3M™ STERI-DRAPE™ U-DRAPE 1015: Brand: STERI-DRAPE™

## (undated) DEVICE — TRAY DRILL SYSTEM 4 REUSABLE

## (undated) DEVICE — NEEDLE HYPO 22GA L1.5IN BLK POLYPR HUB S STL REG BVL STR

## (undated) DEVICE — 3M™ TEGADERM™ TRANSPARENT FILM DRESSING FRAME STYLE, 1626W, 4 IN X 4-3/4 IN (10 CM X 12 CM), 50/CT 4CT/CASE: Brand: 3M™ TEGADERM™

## (undated) DEVICE — INTENDED FOR TISSUE SEPARATION, AND OTHER PROCEDURES THAT REQUIRE A SHARP SURGICAL BLADE TO PUNCTURE OR CUT.: Brand: BARD-PARKER ® STAINLESS STEEL BLADES

## (undated) DEVICE — TUBING, SUCTION, 9/32" X 10', STRAIGHT: Brand: MEDLINE

## (undated) DEVICE — GLOVE SURG SZ 8 CRM LTX FREE POLYISOPRENE POLYMER BEAD ANTI

## (undated) DEVICE — TRAY ORTHO1 REUSABLE

## (undated) DEVICE — TAPE ADH W3INXL10YD WHT COT WVN BK POWERFUL RUB BASE HIGHLY

## (undated) DEVICE — TRAY STRYKER MAKO TOTAL KNEE POWER

## (undated) DEVICE — STRIP,CLOSURE,WOUND,MEDI-STRIP,1/2X4: Brand: MEDLINE

## (undated) DEVICE — TUBE IRRIG HNDPC HI FLO TP INTRPULS W/SUCTION TUBE

## (undated) DEVICE — 3M™ STERI-DRAPE™ INCISE DRAPE 1050 (60CM X 45CM): Brand: STERI-DRAPE™

## (undated) DEVICE — MARKER,SKIN,WI/RULER AND LABELS: Brand: MEDLINE

## (undated) DEVICE — KIT TRK KNEE PROC VIZADISC

## (undated) DEVICE — CORD RETRCT SIL

## (undated) DEVICE — NEEDLE HYPO 25GA L1.5IN BLU POLYPR HUB S STL REG BVL STR

## (undated) DEVICE — BLADE SURG SAW STD S STL OSC W/ SERR EDGE DISP

## (undated) DEVICE — SUTURE STRATAFIX SPRL SZ 2 0 L14IN ABSRB UD MH L36MM 1 2 CIR SXMD2B401

## (undated) DEVICE — KIT SURG W7XL11IN 2 PKT UNTREATED NA

## (undated) DEVICE — DRESSING,GAUZE,XEROFORM,CURAD,1"X8",ST: Brand: CURAD

## (undated) DEVICE — TRAY ELEVATOR COBBS REUSABLE

## (undated) DEVICE — DRESSING PETRO W3XL8IN OIL EMUL N ADH GZ KNIT IMPREG CELOS

## (undated) DEVICE — BLADE SAW SAG SYS 6 18X90X1.27MM

## (undated) DEVICE — ELECTRODE PT RET AD L9FT HI MOIST COND ADH HYDRGEL CORDED

## (undated) DEVICE — SUTURE STRATAFIX SYMMETRIC PDS + SZ 1 L18IN ABSRB VLT OS-6 SXPP1A201

## (undated) DEVICE — CUTTER SURG OD33MM PAT KNEE DISP FOR RM SYSTEMXCELERATE

## (undated) DEVICE — SOLUTION IV IRRIG WATER 1000ML POUR BRL 2F7114

## (undated) DEVICE — KIT INT FIX FEM TIB CKPT MAKOPLASTY

## (undated) DEVICE — INSTRUMENT GRADUATE REUSABLE

## (undated) DEVICE — CUTTER SURG OD31MM PAT KNEE DISP FOR RM SYSTEMXCELERATE

## (undated) DEVICE — CHLORAPREP 26ML ORANGE

## (undated) DEVICE — TRAY LAMBOTS REUSABLE

## (undated) DEVICE — GLOVE ORANGE PI 7   MSG9070

## (undated) DEVICE — NEEDLE SYR 18GA L1.5IN RED PLAS HUB S STL BLNT FILL W/O

## (undated) DEVICE — PADDING CAST W6INXL4YD COT LO LINTING WYTEX

## (undated) DEVICE — Z DISCONTINUED PER MEDLINE USE 2741943 DRESSING AQUACEL 10 IN ALG W9XL25CM SIL CVR WTRPRF VIR BACT BARR ANTIMIC

## (undated) DEVICE — PEEL-AWAY HOOD: Brand: FLYTE, SURGICOOL

## (undated) DEVICE — Z INACTIVE USE 2660664 SOLUTION IRRIG 3000ML 0.9% SOD CHL USP UROMATIC PLAS CONT

## (undated) DEVICE — TUBING, SUCTION, 3/16" X 12', STRAIGHT: Brand: MEDLINE

## (undated) DEVICE — BOWL AND CEMENT CARTRIDGE WITH BREAKAWAY FEMORAL NOZZLE: Brand: ACM

## (undated) DEVICE — PAD,ABDOMINAL,5"X9",ST,LF,25/BX: Brand: MEDLINE INDUSTRIES, INC.

## (undated) DEVICE — DRESSING COMP W4XL4IN N ADH PD W2.5XL2.5IN GZ BORDERED ADH

## (undated) DEVICE — ZIMMER® STERILE DISPOSABLE TOURNIQUET CUFF WITH PLC, DUAL PORT, SINGLE BLADDER, 34 IN. (86 CM)

## (undated) DEVICE — DRAPE,TOP,102X53,STERILE: Brand: MEDLINE

## (undated) DEVICE — PACK PROCEDURE SURG GEN CUST

## (undated) DEVICE — NEEDLE FLTR 18GA L1.5IN MEM THK5UM BLNT DISP

## (undated) DEVICE — INSTRUMENT CLAMP TOWEL LARGE REUSABLE

## (undated) DEVICE — TRAY SYSTEM 7 DRILL REUSABLE

## (undated) DEVICE — SYRINGE MED 30ML STD CLR PLAS LUERLOCK TIP N CTRL DISP

## (undated) DEVICE — 3M™ COBAN™ NL STERILE NON-LATEX SELF-ADHERENT WRAP, 2084S, 4 IN X 5 YD (10 CM X 4,5 M), 18 ROLLS/CASE: Brand: 3M™ COBAN™

## (undated) DEVICE — SYRINGE MED 50ML LUERLOCK TIP

## (undated) DEVICE — TUBING SUCT 12FR MAL ALUM SHFT FN CAP VENT UNIV CONN W/ OBT

## (undated) DEVICE — SPONGE GZ W4XL4IN RAYON POLY FILL CVR W/ NONWOVEN FAB

## (undated) DEVICE — SET ORTHO STD STORTSTD1

## (undated) DEVICE — DRAPE C ARM W41XL74IN UNIV MOB W RUBBERBAND CLP

## (undated) DEVICE — YANKAUER,OPEN TIP,W/O VENT,STERILE: Brand: MEDLINE INDUSTRIES, INC.